# Patient Record
Sex: FEMALE | Race: WHITE | HISPANIC OR LATINO | Employment: UNEMPLOYED | URBAN - METROPOLITAN AREA
[De-identification: names, ages, dates, MRNs, and addresses within clinical notes are randomized per-mention and may not be internally consistent; named-entity substitution may affect disease eponyms.]

---

## 2018-02-08 ENCOUNTER — OFFICE VISIT (OUTPATIENT)
Dept: FAMILY MEDICINE | Facility: HOSPITAL | Age: 63
End: 2018-02-08
Payer: MEDICAID

## 2018-02-08 ENCOUNTER — HOSPITAL ENCOUNTER (OUTPATIENT)
Dept: RADIOLOGY | Facility: HOSPITAL | Age: 63
Discharge: HOME | End: 2018-02-08
Attending: FAMILY MEDICINE
Payer: MEDICAID

## 2018-02-08 VITALS
HEART RATE: 73 BPM | DIASTOLIC BLOOD PRESSURE: 85 MMHG | BODY MASS INDEX: 33.55 KG/M2 | SYSTOLIC BLOOD PRESSURE: 129 MMHG | WEIGHT: 189.38 LBS | HEIGHT: 63 IN

## 2018-02-08 DIAGNOSIS — Z12.31 ENCOUNTER FOR SCREENING MAMMOGRAM FOR BREAST CANCER: ICD-10-CM

## 2018-02-08 DIAGNOSIS — Z01.419 WELL WOMAN EXAM WITH ROUTINE GYNECOLOGICAL EXAM: Primary | ICD-10-CM

## 2018-02-08 DIAGNOSIS — Z01.419 WELL WOMAN EXAM WITH ROUTINE GYNECOLOGICAL EXAM: ICD-10-CM

## 2018-02-08 DIAGNOSIS — Z12.11 ENCOUNTER FOR SCREENING COLONOSCOPY: ICD-10-CM

## 2018-02-08 DIAGNOSIS — Z00.00 HEALTHCARE MAINTENANCE: ICD-10-CM

## 2018-02-08 PROCEDURE — 77063 BREAST TOMOSYNTHESIS BI: CPT | Mod: 26,,, | Performed by: RADIOLOGY

## 2018-02-08 PROCEDURE — 99213 OFFICE O/P EST LOW 20 MIN: CPT | Performed by: FAMILY MEDICINE

## 2018-02-08 PROCEDURE — 77067 SCR MAMMO BI INCL CAD: CPT | Mod: TC

## 2018-02-08 PROCEDURE — 77067 SCR MAMMO BI INCL CAD: CPT | Mod: 26,,, | Performed by: RADIOLOGY

## 2018-02-08 NOTE — PROGRESS NOTES
I was present in clinic during the visit and assume the primary medical care of this patient.  I discussed the case with Dr. Walsh, and I have reviewed and agree with the assessment and plan.

## 2018-02-08 NOTE — PROGRESS NOTES
Subjective:       Patient ID: Adenike Victoria is a 62 y.o. female.    Chief Complaint: Follow-up    HPI   62 year old female without any significant PMH that presents for check up. She states that she has been feeling well without any complaints. She is not up to date on her colonoscopy and needs a mammogram. She denies any vaginal complaints. She is  and her last menstrual cycle 10 years ago.     Review of Systems   Constitutional: Negative for fatigue and fever.   HENT: Negative for sore throat.    Eyes: Negative for visual disturbance.   Respiratory: Negative for shortness of breath and wheezing.    Cardiovascular: Negative for chest pain and palpitations.   Gastrointestinal: Negative for diarrhea and vomiting.   Genitourinary: Negative for menstrual problem and pelvic pain.   Skin: Negative for rash.   Neurological: Negative for headaches.   All other systems reviewed and are negative.      Objective:      Vitals:    02/08/18 0858   BP: 129/85   Pulse: 73     Physical Exam   Constitutional: She is oriented to person, place, and time. She appears well-developed and well-nourished.   HENT:   Head: Normocephalic and atraumatic.   Neck: Normal range of motion.   Cardiovascular: Normal rate and regular rhythm.    No murmur heard.  Pulmonary/Chest: Effort normal. No respiratory distress.   Abdominal: Soft. She exhibits no distension.   Genitourinary: Vagina normal and uterus normal. Cervix exhibits no motion tenderness. Right adnexum displays no mass. Left adnexum displays no mass. No erythema in the vagina. No vaginal discharge found.   Musculoskeletal: Normal range of motion.   Neurological: She is alert and oriented to person, place, and time.   Skin: Skin is warm and dry. No rash noted.   Psychiatric: She has a normal mood and affect.       Assessment:       1. Well woman exam with routine gynecological exam    2. Healthcare maintenance        Plan:       Well woman exam with routine gynecological exam  -      Lipid panel; Future; Expected date: 02/08/2018  -     Comprehensive metabolic panel; Future; Expected date: 02/08/2018  -     Mammo Digital Screening Bilateral With CAD; Future; Expected date: 02/08/2018    Healthcare maintenance  -     Ambulatory referral to Gastroenterology      Follow-up in about 3 months (around 5/8/2018).

## 2018-04-10 ENCOUNTER — OFFICE VISIT (OUTPATIENT)
Dept: GASTROENTEROLOGY | Facility: CLINIC | Age: 63
End: 2018-04-10
Payer: MEDICAID

## 2018-04-10 VITALS
SYSTOLIC BLOOD PRESSURE: 134 MMHG | HEART RATE: 86 BPM | HEIGHT: 63 IN | BODY MASS INDEX: 34.52 KG/M2 | DIASTOLIC BLOOD PRESSURE: 75 MMHG | WEIGHT: 194.81 LBS

## 2018-04-10 DIAGNOSIS — R12 HEARTBURN SYMPTOM: Primary | ICD-10-CM

## 2018-04-10 DIAGNOSIS — R19.7 DIARRHEA, UNSPECIFIED TYPE: ICD-10-CM

## 2018-04-10 DIAGNOSIS — R10.13 EPIGASTRIC PAIN: ICD-10-CM

## 2018-04-10 DIAGNOSIS — Z12.11 COLON CANCER SCREENING: ICD-10-CM

## 2018-04-10 DIAGNOSIS — R14.0 BLOATING: ICD-10-CM

## 2018-04-10 PROCEDURE — 99204 OFFICE O/P NEW MOD 45 MIN: CPT | Mod: S$GLB,,, | Performed by: INTERNAL MEDICINE

## 2018-04-10 NOTE — PATIENT INSTRUCTIONS
La colonoscopia     Se usa becky cámara acoplada a un tubo flexible con becky lente que yousuf imágenes de video.     La colonoscopia es un examen que permite mirar el interior del aparato digestivo inferior (el colon y el recto). Algunas veces de puede mirar la última parte del intestino murphy llamada íleon. Cody examen puede ayudar a detectar si hay cáncer de colon, así jerod a encontrar el origen de un dolor abdominal, sangrado y cambios en los hábitos intestinales.  Des la colonoscopia, un procedimiento ambulatorio que suele efectuarse en el hospital, el proveedor de atención médica puede extirpar (sacar) becky pequeña muestra de tejido, o biopsia, para enviarla a análisis; también puede extirpar pequeñas masas, jerod los pólipos. La frecuencia con la que se requiere becky colonoscopia depende de muchos factores incluyendo  la edad, las afecciones médicas, los antecedentes familiares, los síntomas y lo que se descubra des la colonoscopia anterior.  Los preparativos  · Asegúrese de mencionar al proveedor de atención médica todos los medicamentos que usted yousuf, así jerod cualquier problema de lashanda que tenga.   · Hable con chapin proveedor de atención médica acerca de los riesgos de cody examen, que incluyen sangrado y perforación del intestino, riesgos de la anestesia y los riesgos de que se pase por alto becky lesión.  · Ya que usted debe tener el recto y el colon vacíos para cody examen, asegúrese de seguir la dieta y las instrucciones de preparación de chapin intestino al pie de la letra. Si no lo hace, podría ser necesario postergarle el examen.  · Pregunte a chapin médico si necesita tener a un amigo o familiar preparado para que lo lleven a chapin casa después del examen.  Des el examen  · Le administrarán un sedante (medicamento relajante) a través de becky sonda IV. Quizás usted esté soñoliento o completamente dormido des el examen.  · El procedimiento lleva jerod mínimo 30 minutos.  · Des esta prueba podrían  hacerse biopsias (muestras de tejido), remoción de pólipos y otros tratamientos.  · El médico realiza un examen digital del recto para curt si hay algún problema allí o en el ano. Se lubrica el recto y se introduce el colonoscopio.   · Si usted está despierto, quizás sienta becky sensación similar a la que tiene cuando necesita evacuar. También podría sentir presión a medida que le bombean aire dentro del colon. No se preocupe si tiene que eliminar gases des el procedimiento.     Becky colonoscopia permite al médico curt todo el interior del colon.     Después del examen  · Es posible que chapin médico le informe de los resultados de inmediato, o en becky visita posterior.  · Trate de eliminar todos los gases daniel después del examen, para evitar becky hinchazón y cólicos.  · Después del examen podrá volver a chapin alimentación normal y reanudar mark actividades.  Los riesgos y posibles complicaciones incluyen:  · Sangrado (hemorragia)  · Punción o rasgadura en el colon  · Riesgos propios de la anestesia  · Pasar por alto becky lesión          Date Last Reviewed: 3/30/2014  © 0626-4168 [employer]. [address] [place] [address]. Todos los derechos reservados. Esta información no pretende sustituir la atención médica profesional. Sólo chapin médico puede diagnosticar y tratar un problema de lashanda.        GERD (Adult)    The esophagus is a tube that carries food from the mouth to the stomach. A valve at the lower end of the esophagus prevents stomach acid from flowing upward. When this valve doesn't work properly, stomach contents may repeatedly flow back up (reflux) into the esophagus. This is called gastroesophageal reflux disease (GERD). GERD can irritate the esophagus. It can cause problems with swallowing or breathing. In severe cases, GERD can cause recurrent pneumonia or other serious problems.  Symptoms of reflux include burning, pressure or sharp pain in the upper abdomen or mid to lower chest. The pain  "can spread to the neck, back, or shoulder. There may be belching, an acid taste in the back of the throat, chronic cough, or sore throat or hoarseness. GERD symptoms often occur during the day after a big meal. They can also occur at night when lying down.   Home care  Lifestyle changes can help reduce symptoms. If needed, medicines may be prescribed. Symptoms often improve with treatment, but if treatment is stopped, the symptoms often return after a few months. So most persons with GERD will need to continue treatment.  Lifestyle changes  · Limit or avoid fatty, fried, and spicy foods, as well as coffee, chocolate, mint, and foods with high acid content such as tomatoes and citrus fruit and juices (orange, grapefruit, lemon).  · Dont eat large meals, especially at night. Frequent, smaller meals are best. Do not lie down right after eating. And dont eat anything 3 hours before going to bed.  · Avoid drinking alcohol and smoking. As much as possible, stay away from second hand smoke.  · If you are overweight, losing weight will reduce symptoms.   · Avoid wearing tight clothing around your stomach area.  · If your symptoms occur during sleep, use a foam wedge to elevate your upper body (not just your head.) Or, place 4" blocks under the head of your bed.  Medicines  If needed, medicines can help relieve the symptoms of GERD and prevent damage to the esophagus. Discuss a medicine plan with your healthcare provider. This may include one or more of the following medicines:  · Antacids to help neutralize the normal acids in your stomach.  · Acid blockers (H2 blockers) to decrease acid production.  · Acid inhibitors (PPIs) to decrease acid production in a different way than the blockers. They may work better, but can take a little longer to take effect.  Take an antacid 30-60 minutes after eating and at bedtime, but not at the same time as an acid blocker.  Try not to take medicines such as ibuprofen and aspirin. If you " are taking aspirin for your heart or other medical reasons, talk to your healthcare provider about stopping it.  Follow-up care  Follow up with your healthcare provider or as advised by our staff.  When to seek medical advice  Call your healthcare provider if any of the following occur:  · Stomach pain gets worse or moves to the lower right abdomen (appendix area)  · Chest pain appears or gets worse, or spreads to the back, neck, shoulder, or arm  · Frequent vomiting (cant keep down liquids)  · Blood in the stool or vomit (red or black in color)  · Feeling weak or dizzy  · Fever of 100.4ºF (38ºC) or higher, or as directed by your healthcare provider  Date Last Reviewed: 6/23/2015  © 2880-0249 [employer]. [address] [place] [address], [place] [address]. All rights reserved. This information is not intended as a substitute for professional medical care. Always follow your healthcare professional's instructions.

## 2018-04-10 NOTE — PROGRESS NOTES
"Subjective:      Patient ID: Adenike Victoria is a 63 y.o. female.    Chief Complaint: Abdominal Pain and Gastroesophageal Reflux    HPI:   Patient 63-year-old female, native of [facility].  Gives a history of lifelong diarrhea.  Describes 2 or 3 watery bowel movements daily, typically postprandial  Also has postprandial bloating.  Has not traveled out of the country in the past year.  Denies any recent antibiotic use.  GI systems review includes frequent heartburn for which she takes Tums.  Some nausea.  Past medical history relatively unremarkable.  Nonsmoker.  Nondrinker.  Family history negative for GI disorders.    Review of patient's allergies indicates:  No Known Allergies  History reviewed. No pertinent past medical history.  History reviewed. No pertinent surgical history.  History reviewed. No pertinent family history.  Social History     Social History    Marital status:      Spouse name: N/A    Number of children: N/A    Years of education: N/A     Occupational History    Not on file.     Social History Main Topics    Smoking status: Never Smoker    Smokeless tobacco: Not on file    Alcohol use No    Drug use: No    Sexual activity: Not on file     Other Topics Concern    Not on file     Social History Narrative    No narrative on file       Review of Systems:  Constitutional: Negative for appetite change.  Weight gain  HENT: Negative for trouble swallowing.   Eyes: Negative for photophobia.   Respiratory: Negative for cough and shortness of breath.   Cardiovascular: Negative for palpitations.   Gastrointestinal: See HPI for details.  Genitourinary: Negative for frequency and hematuria.   Skin: Negative for rash.   Neurological: Negative for weakness and headaches.   Hematological: Negative.   Psychiatric/Behavioral: Negative for suicidal ideas and behavioral problems.     Objective:     /75 (BP Location: Right arm, Patient Position: Sitting)   Pulse 86   Ht 5' 3" (1.6 m)   Wt " 88.4 kg (194 lb 12.8 oz)   LMP 01/01/2013   BMI 34.51 kg/m²     Physical Exam:  Alert no distress.  Eyes: Pupils are equal, round, and reactive to light.   Neck: Supple. No mass  Cardiovascular: Regular rhythm . No murmur   Pulmonary/Chest: Lungs clear   Abdominal: Soft. No mass palpated. Nontender, no guarding. Positive bowel sounds   Musculoskeletal: No deformity. No edema.   Psychiatric: Alert and oriented    Assessment:     1. Heartburn symptom    2. Epigastric pain    3. Bloating    4. Diarrhea, unspecified type      Plan:     Adenike was seen today for abdominal pain and gastroesophageal reflux.    Diagnoses and all orders for this visit:    Heartburn symptom  -     ranitidine (ZANTAC) 300 MG tablet; Take 1 tablet (300 mg total) by mouth every evening.    Epigastric pain  -     US Abdomen Complete; Future    Bloating  -     TISSUE TRANSGLUTAMINASE (TTG), IGA; Future  -     IgA; Standing  -     Stool Exam-Ova,Cysts,Parasites; Future  -     Giardia / Cryptosporidum, EIA; Future  -     US Abdomen Complete; Future  -     IgA    Diarrhea, unspecified type  -     TISSUE TRANSGLUTAMINASE (TTG), IGA; Future  -     IgA; Standing  -     Stool Exam-Ova,Cysts,Parasites; Future  -     Giardia / Cryptosporidum, EIA; Future  -     IgA      Plan:  Antireflux measures, ranitidine daily  Stool studies, celiac screen  Colonoscopy, screening

## 2018-04-10 NOTE — LETTER
April 10, 2018      Babs Walsh MD  [address]  [facility] [address]           [facility]  [address] , [address]   [facility] [address]  Phone: [phone]  Fax: [phone]          Patient: Adenike Victoria   MR Number: [id number]   YOB: 1955   Date of Visit: 4/10/2018       Dear Dr. Babs Walsh:    Thank you for referring Adenike Victoria to me for evaluation. Attached you will find relevant portions of my assessment and plan of care.    If you have questions, please do not hesitate to call me. I look forward to following Adenike Victoria along with you.    Sincerely,    Jose Alberto Bell Jr., MD    Enclosure  CC:  No Recipients    If you would like to receive this communication electronically, please contact [email or web address] or [phone] to request more information on "[employer] Link access.    For providers and/or their staff who would like to refer a patient to [facility], please contact us through our one-stop-shop provider referral line, [facility], at [phone].    If you feel you have received this communication in error or would no longer like to receive these types of communications, please e-mail [email or web address]

## 2018-04-30 ENCOUNTER — TELEPHONE (OUTPATIENT)
Dept: GASTROENTEROLOGY | Facility: CLINIC | Age: 63
End: 2018-04-30

## 2018-04-30 DIAGNOSIS — Z12.11 ENCOUNTER FOR SCREENING COLONOSCOPY: Primary | ICD-10-CM

## 2018-04-30 NOTE — TELEPHONE ENCOUNTER
----- Message from Jose Alberto Bell Jr., MD sent at 4/13/2018  1:31 PM CDT -----  Lab work negative for intestinal parasites. Negative for celiac disease  Notify patient.

## 2018-04-30 NOTE — TELEPHONE ENCOUNTER
----- Message from Araseli Kilgore MA sent at 4/17/2018  7:36 AM CDT -----      ----- Message -----  From: Jose Alberto Bell Jr., MD  Sent: 4/16/2018   4:58 PM  To: [facility] Staff ([place])    Ultrasound shows one GB stone./  This could be a cause for abdominal pain  Plan f/u after all testing complete    Notify patient.

## 2018-04-30 NOTE — TELEPHONE ENCOUNTER
Patient was notified of test results and scheduled for an Colonoscopy at [facility] on 5/29/18.

## 2018-04-30 NOTE — TELEPHONE ENCOUNTER
Patient is scheduled for Screening Colonoscopy at [facility] on 5/29/18,prep instructions was explained and mailed out.

## 2018-05-22 ENCOUNTER — TELEPHONE (OUTPATIENT)
Dept: GASTROENTEROLOGY | Facility: CLINIC | Age: 63
End: 2018-05-22

## 2018-05-22 NOTE — TELEPHONE ENCOUNTER
Spoke with patient and let her know that we have to reschedule her colonoscopy with Dr. Trivedi and we will contact her when the doctors schedule is open.

## 2018-05-25 ENCOUNTER — TELEPHONE (OUTPATIENT)
Dept: GASTROENTEROLOGY | Facility: CLINIC | Age: 63
End: 2018-05-25

## 2018-05-25 NOTE — TELEPHONE ENCOUNTER
Spoke with patient and rescheduled her colonoscopy for 6/7/18 with Dr. Trivedi, spoke with Emily to have patient moved to schedule.

## 2018-06-07 PROBLEM — Z12.11 SCREEN FOR COLON CANCER: Status: ACTIVE | Noted: 2018-06-07

## 2018-06-19 ENCOUNTER — TELEPHONE (OUTPATIENT)
Dept: GASTROENTEROLOGY | Facility: CLINIC | Age: 63
End: 2018-06-19

## 2018-06-19 NOTE — TELEPHONE ENCOUNTER
----- Message from Mikayla Trivedi MD sent at 6/18/2018  8:49 AM CDT -----  Hyperplastic polyps.  She needs to RTC in 3 months so that repeat colonoscopy with aggressive 2 day prep can be ordered.

## 2018-10-23 ENCOUNTER — OFFICE VISIT (OUTPATIENT)
Dept: OPTOMETRY | Facility: CLINIC | Age: 63
End: 2018-10-23
Payer: MEDICAID

## 2018-10-23 DIAGNOSIS — H52.7 REFRACTIVE ERROR: ICD-10-CM

## 2018-10-23 DIAGNOSIS — H25.13 NUCLEAR SCLEROSIS OF BOTH EYES: Primary | ICD-10-CM

## 2018-10-23 PROCEDURE — 99212 OFFICE O/P EST SF 10 MIN: CPT | Mod: PBBFAC,PO | Performed by: OPTOMETRIST

## 2018-10-23 PROCEDURE — 92004 COMPRE OPH EXAM NEW PT 1/>: CPT | Mod: S$PBB,,, | Performed by: OPTOMETRIST

## 2018-10-23 PROCEDURE — 99999 PR PBB SHADOW E&M-EST. PATIENT-LVL II: CPT | Mod: PBBFAC,,, | Performed by: OPTOMETRIST

## 2018-10-23 PROCEDURE — 92015 DETERMINE REFRACTIVE STATE: CPT | Mod: ,,, | Performed by: OPTOMETRIST

## 2018-10-23 NOTE — PROGRESS NOTES
MOIZ BEGUM about 6 months ago elsewhere.  Has glasses for reading , about 2 yrs.   Old, don't seem to be right anymore. Didn't bring glasses today.    Last edited by Yulia Gonzalez on 10/23/2018  9:35 AM. (History)            Assessment /Plan     For exam results, see Encounter Report.    Nuclear sclerosis of both eyes    Refractive error      1. Educated pt on presence of cataracts and effects on vision. No surgery at this time. Recheck in one year.  2. Spec Rx given. Different lens options discussed with patient. RTC 1 year full exam.

## 2019-01-29 DIAGNOSIS — Z12.39 SCREENING BREAST EXAMINATION: Primary | ICD-10-CM

## 2019-02-25 ENCOUNTER — HOSPITAL ENCOUNTER (OUTPATIENT)
Dept: RADIOLOGY | Facility: HOSPITAL | Age: 64
Discharge: HOME | End: 2019-02-25
Payer: MEDICAID

## 2019-02-25 DIAGNOSIS — Z12.39 SCREENING BREAST EXAMINATION: ICD-10-CM

## 2019-02-25 PROCEDURE — 77067 MAMMO DIGITAL SCREENING BILAT WITH TOMOSYNTHESIS_CAD: ICD-10-PCS | Mod: 26,,, | Performed by: RADIOLOGY

## 2019-02-25 PROCEDURE — 77067 SCR MAMMO BI INCL CAD: CPT | Mod: TC

## 2019-02-25 PROCEDURE — 77063 BREAST TOMOSYNTHESIS BI: CPT | Mod: 26,,, | Performed by: RADIOLOGY

## 2019-02-25 PROCEDURE — 77067 SCR MAMMO BI INCL CAD: CPT | Mod: 26,,, | Performed by: RADIOLOGY

## 2019-02-25 PROCEDURE — 77063 MAMMO DIGITAL SCREENING BILAT WITH TOMOSYNTHESIS_CAD: ICD-10-PCS | Mod: 26,,, | Performed by: RADIOLOGY

## 2019-10-29 ENCOUNTER — TELEPHONE (OUTPATIENT)
Dept: TRANSPLANT | Facility: CLINIC | Age: 64
End: 2019-10-29

## 2019-10-29 DIAGNOSIS — Z00.5 TRANSPLANT DONOR EVALUATION: Primary | ICD-10-CM

## 2019-10-29 NOTE — TELEPHONE ENCOUNTER
Adenike Wagner called and stated that she is interested in becoming a living donor for her ex-, Shaan Victoria.  Medical and social history obtained.  No contraindications noted. Weight loss strongly encouraged. All questions answered.  Crossmatch scheduled 11/6/19. Patient verbalized understanding.

## 2019-11-06 ENCOUNTER — LAB VISIT (OUTPATIENT)
Dept: LAB | Facility: HOSPITAL | Age: 64
End: 2019-11-06
Payer: MEDICAID

## 2019-11-06 DIAGNOSIS — Z00.5 TRANSPLANT DONOR EVALUATION: ICD-10-CM

## 2019-11-06 LAB
ABO + RH BLD: NORMAL
BLD GP AB SCN CELLS X3 SERPL QL: NORMAL

## 2019-11-06 PROCEDURE — 81373 HLA I TYPING 1 LOCUS LR: CPT | Mod: 91,PO,TXP

## 2019-11-06 PROCEDURE — 81373 HLA I TYPING 1 LOCUS LR: CPT | Mod: PO,TXP

## 2019-11-06 PROCEDURE — 81376 HLA II TYPING 1 LOCUS LR: CPT | Mod: PO,TXP

## 2019-11-06 PROCEDURE — 81376 HLA II TYPING 1 LOCUS LR: CPT | Mod: 91,PO,TXP

## 2019-11-06 PROCEDURE — 36415 COLL VENOUS BLD VENIPUNCTURE: CPT | Mod: TXP

## 2019-11-06 PROCEDURE — 86901 BLOOD TYPING SEROLOGIC RH(D): CPT | Mod: TXP

## 2019-11-12 LAB — HLATY INTERPRETATION: NORMAL

## 2019-11-26 LAB
HLA DRB4 1: NORMAL
HLA SSO DNA TYPING CLASS I & II INTERPRETATION: NORMAL
HLA-A 1 SERO. EQUIV: 1
HLA-A 1: NORMAL
HLA-A 2 SERO. EQUIV: 68
HLA-A 2: NORMAL
HLA-B 1 SERO. EQUIV: 35
HLA-B 1: NORMAL
HLA-B 2 SERO. EQUIV: 51
HLA-B 2: NORMAL
HLA-BW 1 SERO. EQUIV: 6
HLA-BW 2 SERO. EQUIV: 4
HLA-C 1: NORMAL
HLA-C 2: NORMAL
HLA-CW 1 SERO. EQUIV: 2
HLA-CW 2 SERO. EQUIV: 4
HLA-DQ 1 SERO. EQUIV: 2
HLA-DQ 2 SERO. EQUIV: 6
HLA-DQB1 1: NORMAL
HLA-DQB1 2: NORMAL
HLA-DRB1 1 SERO. EQUIV: 17
HLA-DRB1 1: NORMAL
HLA-DRB1 2 SERO. EQUIV: 11
HLA-DRB1 2: NORMAL
HLA-DRB3 1: NORMAL
HLA-DRB3 2: NORMAL
HLA-DRB345 1 SERO. EQUIV: 52
HLA-DRB345 2 SERO. EQUIV: NORMAL
HLA-DRB4 2: NORMAL
HLA-DRB5 1: NORMAL
HLA-DRB5 2: NORMAL
SSDQB TESTING DATE: NORMAL
SSDRB TESTING DATE: NORMAL
SSOA TESTING DATE: NORMAL
SSOB TESTING DATE: NORMAL
SSOC TESTING DATE: NORMAL
SSODR TESTING DATE: NORMAL
TYSSO TESTING DATE: NORMAL

## 2019-12-18 ENCOUNTER — TELEPHONE (OUTPATIENT)
Dept: TRANSPLANT | Facility: CLINIC | Age: 64
End: 2019-12-18

## 2019-12-18 NOTE — TELEPHONE ENCOUNTER
Patient notified that the results of the crossmatch with her ex- show that they are compatible. Patient states he would like to proceed with the medical evaluation. Informed that we can not schedule testing until the recipient has been placed on the waiting list. Understands that I will notify her when testing can be scheduled. All questions were answered and patient verbalized understanding.     International  used for conversation: Gricel Tejada

## 2020-02-07 ENCOUNTER — DOCUMENTATION ONLY (OUTPATIENT)
Dept: TRANSPLANT | Facility: CLINIC | Age: 65
End: 2020-02-07

## 2020-09-03 ENCOUNTER — OFFICE VISIT (OUTPATIENT)
Dept: FAMILY MEDICINE | Facility: HOSPITAL | Age: 65
End: 2020-09-03
Attending: FAMILY MEDICINE
Payer: MEDICAID

## 2020-09-03 VITALS
DIASTOLIC BLOOD PRESSURE: 74 MMHG | SYSTOLIC BLOOD PRESSURE: 123 MMHG | HEART RATE: 79 BPM | WEIGHT: 195.31 LBS | BODY MASS INDEX: 33.34 KG/M2 | HEIGHT: 64 IN

## 2020-09-03 DIAGNOSIS — Z12.31 ENCOUNTER FOR SCREENING MAMMOGRAM FOR BREAST CANCER: ICD-10-CM

## 2020-09-03 DIAGNOSIS — G56.03 CARPAL TUNNEL SYNDROME, BILATERAL: ICD-10-CM

## 2020-09-03 DIAGNOSIS — Z00.00 HEALTHCARE MAINTENANCE: Primary | ICD-10-CM

## 2020-09-03 PROCEDURE — 99214 OFFICE O/P EST MOD 30 MIN: CPT | Performed by: STUDENT IN AN ORGANIZED HEALTH CARE EDUCATION/TRAINING PROGRAM

## 2020-09-03 RX ORDER — IBUPROFEN 800 MG/1
800 TABLET ORAL 3 TIMES DAILY
Qty: 42 TABLET | Refills: 0 | Status: SHIPPED | OUTPATIENT
Start: 2020-09-03 | End: 2020-09-17

## 2020-09-03 NOTE — PROGRESS NOTES
I assume primary medical responsibility for this patient. I have reviewed the history, physical, and assessement & treatment plan with the resident and agree that the care is reasonable and necessary. This service has been performed by a resident without the presence of a teaching physician under the primary care exception. If necessary, an addendum of additional findings or evaluation beyond the resident documentation will be noted below.    Likely carpal tunel, trial splints    Belinda Pace MD

## 2020-09-03 NOTE — PATIENT INSTRUCTIONS
Consejos para prevenir el síndrome del túnel carpiano  Ciertos movimientos repetitivos de la mano pueden incrementar el riesgo de desarrollar el síndrome del túnel carpiano (CTS, por mark siglas en inglés). Aprendiendo a modificar el modo cómo usa las yazan, puede ayudar a reducir el riesgo. Tenga presentes los consejos que siguen a continuación en la casa y en el trabajo, y asegúrese de seguir los reglamentos y procedimientos de seguridad de chapin compañía relativos a las yazan y muñecas.     Mantenga la chacorta en posición neutra (recta) al hacer ejercicio.       Mantenga la chacorta en posición neutra  Evite usar la chacorta en becky posición flexionada, extendida o torcida des mucho rato. Intente mantenerla en posición neutra (recta).  Tenga cuidado al agarrar cosas  Agarrar, tratar de alcanzar algo o levantarlo con los dedos pulgar e índice puede poner tensión en la chacorta. Siempre que pueda, use toda la mano y los dedos para agarrar un objeto.  Reduzca al mínimo la repetición  Tareas sencillas y ligeras pueden a la larga causar lesiones. Si es posible, evite los movimientos repetitivos o evite sujetar un objeto en la misma posición des mucho tiempo.  Descanse las yazan  Periódicamente, dé un respiro a mark yazan, dejándolas descansar un rato. O, podría alternar tareas fáciles y pesadas, cambiar de mano o variar las actividades laborales.  Reduzca la velocidad y la fuerza  Reducir la velocidad con que usted hace un movimiento forzado y repetitivo da tiempo a chapin chacorta para recuperarse. El uso de herramientas eléctricas ayuda a reducir la fuerza.  Ejercicios de preparación física  Ciertos ejercicios fortalecen los músculos de yazan y brazos. Pueden ser útiles al reducir la necesidad de compensar la debilidad de los músculos con becky postura inadecuada.  Date Last Reviewed: 9/11/2015  © 9542-7914 [employer]. [address] [place] [address], [place] [address]. Todos los derechos reservados. Esta información no  pretende sustituir la atención médica profesional. Sólo chapin médico puede diagnosticar y tratar un problema de lashanda.        Síndrome Del Túnel Carpiano [Carpal Tunnel Syndrome]    El síndrome del túnel carpiano es un trastorno doloroso que afecta a la chacorta y al brazo. Chapin causa es la presión sobre el nervio mediano.  El nervio mediano es pia de los nervios que dan sensibilidad y movimiento a la mano. Pasa a través de un túnel en la chacorta, llamado túnel carpiano. Tatyana túnel está formado por huesos y ligamentos. El estrechamiento de tatyana túnel o la inflamación de los tejidos en chapin interior ejerce presión sobre el nervio mediano, lo cual produce entumecimiento, pinchazos o dolor eléctrico en la mano y el antebrazo. A menudo el dolor aumenta por la noche y puede despertarlo.  El síndrome del túnel carpiano puede ocurrir des el embarazo y con el uso de píldoras anticonceptivas. Es más común en las personas cuyo trabajo requiere que doblen frecuentemente las muñecas o que utilicen máquinas o instrumentos que producen vibraciones denny.  Cuidados En [place]:  1. Descanse la chacorta dolorida. Evite doblar repetidamente la chacorta hacia adelante y hacia atrás, ya que esto ejerce presión sobre el nervio mediano. Evite el uso de máquinas o instrumentos que produzcan vibraciones denny.  2. Si le dieron becky férula, úsela por la noche mientras duerme. También puede usarla des el día para estar más cómodo.  3. Mueva los dedos y las muñecas a menudo para evitar la rigidez.  4. Algunas veces los cambios en el lugar de trabajo pueden aliviar los síntomas. Si usted pasa la mayor parte del día escribiendo en un teclado, puede ser útil que cambie la posición del teclado o que agregue un soporte para la chacorta. Chapin chacorta debe estar en becky posición neutral y no inclinada hacia atrás al escribir.  5. Puede jonnathan ibuprofeno (Motrin o Advil) o naproxeno (Aleve o Naprosyn) para aliviar el dolor y la inflamación, a menos que le  hayan recetado otro medicamento. Si no puede jonnathan estos medicamentos, el acetaminofén (Tylenol) puede ayudarle a aliviar el dolor, declan no reducirá la inflamación. [ NOTA: Si tiene becky enfermedad hepática o renal crónica, o ha tenido alguna vez becky úlcera estomacal o sangrado gastrointestinal, consulte con chapin médico antes de jonnathan estos medicamentos.]  6. Los medicamentos narcóticos contra el dolor sólo proporcionan alivio temporal y no tratan el problema. Si el dolor continúa, es posible que necesite becky inyección de esteroides en la chacorta.  7. Si las medidas anteriores no brad resultado, milagros vez necesite cirugía para abrir el túnel carpiano y aliviar la presión del nervio comprimido.  Programe becky VISITA DE CONTROL con chapin médico o con cody centro si el dolor no empieza a aliviarse en el transcurso de becky semana.  [NOTA: Si le hicieron radiografías, estas serán examinadas por un radiólogo y le informarán de los nuevos hallazgos que puedan afectar la atención médica que necesita.]  Busque Prontamente Atención Médica  si algo de lo siguiente ocurre:  · Dolor que no mejora con el tratamiento mencionado  · Mano o dedos fríos, azulados, entumecidos o con hormigueo  · Todo el brazo está hinchado o debilitado  Date Last Reviewed: 11/23/2015  © 9679-5814 [employer]. [address] [place] [address], [place] [address]. Todos los derechos reservados. Esta información no pretende sustituir la atención médica profesional. Sólo chapin médico puede diagnosticar y tratar un problema de lashanda.

## 2020-09-03 NOTE — PROGRESS NOTES
"History & Physical  Clinic      Patient ID:  NAME: Adenike Wagner  MR#: [id number]  : 1955    SUBJECTIVE:  CC: Orders (mammogram) and Fatigue    HPI: Ms. Adenike Wagner is a 65 y.o. female with no significant PMHx who presents today for a screening mammogram order and carpal tunnel symptoms. Pt states for about 1 year she's been having b/l hand numbness/tingling at night. Sometimes has numbness/tingling hands worse after texting on her phone during the day. Has not tried anything for it. She denies injury, neck pain, shoulder pain. Pt has been having some  strength weakness as well over the past couple of months.     Pt has not been to our clinic in 2 yrs. Denies alcohol/tobacco/drugs. Walks 6 miles/day. Does not eat a particular diet. Is not employed.    Post menopausal. LMP at age 57. Last pap "3 yrs ago and was normal"     Last colonoscopy  w/ polyps, was recommended to f/u in 3-6 months for repeat colonoscopy d/t fecal material blocking 25% of colon.    No family hx of medical issues  History reviewed. No pertinent past medical history.   Past Surgical History:   Procedure Laterality Date    COLONOSCOPY N/A 2018    Procedure: COLONOSCOPY;  Surgeon: Mikayla Trivedi MD;  Location: [facility];  Service: Endoscopy;  Laterality: N/A;  aborted, inadequate prep    COLONOSCOPY N/A 2018    Procedure: COLONOSCOPY;  Surgeon: Mikayla Trivedi MD;  Location: [facility];  Service: Endoscopy;  Laterality: N/A;      History reviewed. No pertinent family history.   Social History     Socioeconomic History    Marital status:      Spouse name: Not on file    Number of children: Not on file    Years of education: Not on file    Highest education level: Not on file   Occupational History    Not on file   Social Needs    Financial resource strain: Not on file    Food insecurity     Worry: Not on file     Inability: Not on file    Transportation needs     Medical: Not on file     " Non-medical: Not on file   Tobacco Use    Smoking status: Never Smoker    Smokeless tobacco: Never Used   Substance and Sexual Activity    Alcohol use: No    Drug use: No    Sexual activity: Not on file   Lifestyle    Physical activity     Days per week: Not on file     Minutes per session: Not on file    Stress: Not on file   Relationships    Social connections     Talks on phone: Not on file     Gets together: Not on file     Attends Bahai service: Not on file     Active member of club or organization: Not on file     Attends meetings of clubs or organizations: Not on file     Relationship status: Not on file   Other Topics Concern    Not on file   Social History Narrative    Not on file        There is no immunization history on file for this patient.   Review of patient's allergies indicates:  No Known Allergies   No current outpatient medications on file prior to visit.     No current facility-administered medications on file prior to visit.       Review of Systems   Constitutional: Negative for chills, diaphoresis, fever, malaise/fatigue and weight loss.   HENT: Negative for congestion and sore throat.    Eyes: Negative for blurred vision and double vision.   Respiratory: Negative for cough, shortness of breath and wheezing.    Cardiovascular: Negative for chest pain, palpitations and leg swelling.   Gastrointestinal: Negative for abdominal pain, constipation, diarrhea, melena, nausea and vomiting.   Genitourinary: Negative for dysuria and hematuria.   Musculoskeletal: Negative for back pain, joint pain and myalgias.   Skin: Negative for itching and rash.   Neurological: Positive for tingling, sensory change and weakness. Negative for dizziness and headaches.   Endo/Heme/Allergies: Negative for environmental allergies. Does not bruise/bleed easily.   Psychiatric/Behavioral: Negative for depression. The patient is not nervous/anxious.       OBJECTIVE:   /74 (BP Location: Right arm, Patient  "Position: Sitting, BP Method: Large (Automatic))   Pulse 79   Ht 5' 4" (1.626 m)   Wt 88.6 kg (195 lb 5.2 oz)   LMP 01/01/2013   BMI 33.53 kg/m²    BP Readings from Last 4 Encounters:   09/03/20 123/74   06/14/18 128/60   06/07/18 (!) 105/48   04/10/18 134/75      Wt Readings from Last 4 Encounters:   09/03/20 88.6 kg (195 lb 5.2 oz)   06/14/18 85.7 kg (189 lb)   06/07/18 88.9 kg (196 lb)   04/10/18 88.4 kg (194 lb 12.8 oz)      Physical Exam:  Physical Exam   Constitutional: She is oriented to person, place, and time and well-developed, well-nourished, and in no distress. No distress.   Obese   HENT:   Head: Normocephalic and atraumatic.   Mouth/Throat: Oropharynx is clear and moist.   Eyes: Pupils are equal, round, and reactive to light. Conjunctivae and EOM are normal.   Neck: Normal range of motion. Neck supple.   Cardiovascular: Normal rate, regular rhythm, normal heart sounds and intact distal pulses.   Pulmonary/Chest: Effort normal and breath sounds normal.   Abdominal: Soft. Bowel sounds are normal.   Musculoskeletal: Normal range of motion.      Comments: Full AROM/PROM at wrist. Neurovascularly intact.  strength 5/5 b/l.    +tinels, reverse phalen b/l   Neurological: She is alert and oriented to person, place, and time.   Skin: Skin is warm and dry. She is not diaphoretic.   Psychiatric: Affect normal.         Lab Results   Component Value Date    WBC 4.88 02/21/2014    HGB 12.5 02/21/2014    HCT 37.3 02/21/2014    MCV 87 02/21/2014     02/21/2014     Lab Results   Component Value Date     02/08/2018    K 3.8 02/08/2018     02/08/2018    CO2 30 (H) 02/08/2018    BUN 14 02/08/2018    CREATININE 0.7 02/08/2018    CALCIUM 10.3 02/08/2018     Lab Results   Component Value Date    AST 19 02/08/2018    ALT 18 02/08/2018    ALBUMIN 4.1 02/08/2018    PROT 8.4 02/08/2018    BILITOT 0.6 02/08/2018     Lab Results   Component Value Date    HGBA1C 5.4 02/21/2014    TSH 0.664 02/21/2014     "   Lab Results   Component Value Date    CHOL 262 (H) 02/08/2018    TRIG 230 (H) 02/08/2018    HDL 64 02/08/2018    LDLCALC 152.0 02/08/2018     The 10-year ASCVD risk score (Adriana MCGUIRE Jr., et al., 2013) is: 4.6%    Values used to calculate the score:      Age: 65 years      Sex: Female      Is Non- : No      Diabetic: No      Tobacco smoker: No      Systolic Blood Pressure: 123 mmHg      Is BP treated: No      HDL Cholesterol: 64 mg/dL      Total Cholesterol: 262 mg/dL   No results found for: HAV, HEPAIGM, HEPBIGM, HEPBCAB, HBEAG, HEPCAB   No results found for: HIV1X2     ASSESSMENT:  1. Healthcare maintenance    2. Carpal tunnel syndrome, bilateral    3. Encounter for screening mammogram for breast cancer        PLAN:  1. Healthcare maintenance  - Comprehensive metabolic panel; Future  - Hemoglobin A1C; Future  - Lipid Panel; Future  - CBC auto differential; Future  - TSH; Future  - HIV 1/2 Ag/Ab (4th Gen); Future  - Hepatitis C Antibody; Future    2. Carpal tunnel syndrome, bilateral  - ibuprofen (ADVIL,MOTRIN) 800 MG tablet; Take 1 tablet (800 mg total) by mouth 3 (three) times daily. for 14 days  Dispense: 42 tablet; Refill: 0    3. Encounter for screening mammogram for breast cancer  - Mammo Digital Screening Bilateral With CAD; Future        There is no immunization history on file for this patient.     Health Maintenance:  - Colonoscopy: DUE - polyps in 2018, but needs repeat d/t incomplete visualization of colon   (Grade A: adults 50-75; colonoscopy q10y or annual fecal immunochemical testing (FIT) as first-tier test; CT colonography q5y, FIT-fecal DNA testing q3y, or flexible sigmoidoscopy q5-10 years as second-tier tests; and capsule colonography q5y as a third-tier test)  - DM: Not Diabetic  (Grade B: adults 40-70 with BMI ?25; if normal, repeat q3y)  - If Diabetic:   - Foot Exam: N/A    - Eye Exam: N/A  - MMG: DUE - ORDERED  (Grade B: q1-2y for women 40-75; >75 after discussion on  harms and benefits with patient)  - PAP: DUE - needs at next visit  (Grade A: q3y with cervical cytology alone in women 21-29 years. For women 30-65 years, q3y with cervical cytology alone, q5y with high-risk HPV (hrHPV) testing alone, or q5y with hrHPV testing in combination with cytology)  - Statin: N/A  (Grade B: adults 40-75 years with no history of CVD, ?1 CVD risk factors (dyslipidemia, DM, HTN, or smoking), and ASCVD ?10%)  - Flu: Instructed patient to receive flu vaccine - Patient agreed  (All patients ?6 months, qyear)  - PCV 13: N/A  (adults ?65 years; adults <64 years with HIV, asplenia, CKD, malignancy or solid organ transplant)  - PPSV 23: N/A  (adults ?65 years; adults <64 years who smoke, long-term facility care resident, heart disease (ex. HTN), lung disease, liver disease, DM or alcohol)   - If PPSV 23 is given, wait 1 year before giving PCV 13  - Shingles: Instructed patient to receive shingles vaccine - Patient agreed  (adults ?50 years)  - HCV: DUE - ORDERED  (Grade B: screen all patients age 18-79)  - HIV: DUE - ORDERED  (Grade A: ages 15-65 years; ?66 if high-risk)  - DXA: N/A  (Grade B: women ?65 years or post-menopausal women with risk-factors)  - LDCT: N/A  (Grade B: q1yr for adults 55-80 years with 30 PY and currently smoke or have quit ?15 years)  - US abdomen: N/A   (Grade B: one-time screening for AAA in men 65-75 years who have ever smoked)  - ASA: N/A  (Grade B: low dose ASA for adults 50-59 years with a ?10% 10-year cardiovascular risk)  - Depression: NO  (All adults)  - Fall risk: NO  Get Up and Go Test (positive >30 seconds, or negative <20; get up, walk 10 feet, turn around and sit; adults ?65 years).  - Tobacco abuse: NEVER SMOKER  (Grade A: all adults)    DUE AT NEXT/FUTURE VISIT:  Colon cancer screening and Pap Smear        Future Appointments   Date Time Provider Department Center   10/16/2020  8:00 AM Denver Abbott OD [facility] [place]       Dispo: RTC 3 months  with labs. Instructed pt to wear cockup splints at night for carpal tunnel syndrome. Also gave prescription for ibuprofen 14 day course. Ordered screening mammogram and labs today.     Aj Salgado MD, PGY-II  09/03/2020

## 2020-09-11 ENCOUNTER — TELEPHONE (OUTPATIENT)
Dept: FAMILY MEDICINE | Facility: HOSPITAL | Age: 65
End: 2020-09-11

## 2020-09-24 ENCOUNTER — HOSPITAL ENCOUNTER (OUTPATIENT)
Dept: RADIOLOGY | Facility: HOSPITAL | Age: 65
Discharge: HOME | End: 2020-09-24
Attending: STUDENT IN AN ORGANIZED HEALTH CARE EDUCATION/TRAINING PROGRAM
Payer: MEDICAID

## 2020-09-24 DIAGNOSIS — Z12.31 ENCOUNTER FOR SCREENING MAMMOGRAM FOR BREAST CANCER: ICD-10-CM

## 2020-09-24 PROCEDURE — 77067 SCR MAMMO BI INCL CAD: CPT | Mod: 26,,, | Performed by: RADIOLOGY

## 2020-09-24 PROCEDURE — 77063 BREAST TOMOSYNTHESIS BI: CPT | Mod: 26,,, | Performed by: RADIOLOGY

## 2020-09-24 PROCEDURE — 77067 SCR MAMMO BI INCL CAD: CPT | Mod: TC

## 2020-09-24 PROCEDURE — 77063 MAMMO DIGITAL SCREENING BILAT WITH TOMO: ICD-10-PCS | Mod: 26,,, | Performed by: RADIOLOGY

## 2020-09-24 PROCEDURE — 77067 MAMMO DIGITAL SCREENING BILAT WITH TOMO: ICD-10-PCS | Mod: 26,,, | Performed by: RADIOLOGY

## 2020-11-16 ENCOUNTER — OFFICE VISIT (OUTPATIENT)
Dept: OPTOMETRY | Facility: CLINIC | Age: 65
End: 2020-11-16
Payer: MEDICAID

## 2020-11-16 DIAGNOSIS — H25.13 NUCLEAR SCLEROSIS OF BOTH EYES: Primary | ICD-10-CM

## 2020-11-16 DIAGNOSIS — H02.9 LESION OF EYELID: ICD-10-CM

## 2020-11-16 DIAGNOSIS — H52.7 REFRACTIVE ERROR: ICD-10-CM

## 2020-11-16 PROCEDURE — 99212 OFFICE O/P EST SF 10 MIN: CPT | Mod: PBBFAC,PO | Performed by: OPTOMETRIST

## 2020-11-16 PROCEDURE — 99999 PR PBB SHADOW E&M-EST. PATIENT-LVL II: CPT | Mod: PBBFAC,,, | Performed by: OPTOMETRIST

## 2020-11-16 PROCEDURE — 92014 PR EYE EXAM, EST PATIENT,COMPREHESV: ICD-10-PCS | Mod: S$PBB,,, | Performed by: OPTOMETRIST

## 2020-11-16 PROCEDURE — 99999 PR PBB SHADOW E&M-EST. PATIENT-LVL II: ICD-10-PCS | Mod: PBBFAC,,, | Performed by: OPTOMETRIST

## 2020-11-16 PROCEDURE — 92014 COMPRE OPH EXAM EST PT 1/>: CPT | Mod: S$PBB,,, | Performed by: OPTOMETRIST

## 2020-11-16 PROCEDURE — 92015 PR REFRACTION: ICD-10-PCS | Mod: ,,, | Performed by: OPTOMETRIST

## 2020-11-16 PROCEDURE — 92015 DETERMINE REFRACTIVE STATE: CPT | Mod: ,,, | Performed by: OPTOMETRIST

## 2020-11-16 NOTE — PROGRESS NOTES
MOIZ BEGUM 10/2018  Wears OTC +2.25 readers, seems to work fine.  Distance seems   fine without glasses.  Patient has noticed a bump under RLL and it has   progressively gotten bigger.  Not using any drops.     Last edited by Yulia Gonzalez on 11/16/2020  8:51 AM. (History)            Assessment /Plan     For exam results, see Encounter Report.    Nuclear sclerosis of both eyes    Refractive error    Lesion of eyelid      1. Educated pt on presence of cataracts and effects on vision. No surgery at this time. Recheck in one year.  2. New Spec Rx given. Different lens options discussed with patient. RTC 1 year full exam.  3. Refer to [facility] for eval.

## 2020-11-18 ENCOUNTER — TELEPHONE (OUTPATIENT)
Dept: OPHTHALMOLOGY | Facility: CLINIC | Age: 65
End: 2020-11-18

## 2021-03-04 ENCOUNTER — OFFICE VISIT (OUTPATIENT)
Dept: FAMILY MEDICINE | Facility: HOSPITAL | Age: 66
End: 2021-03-04
Attending: FAMILY MEDICINE
Payer: MEDICAID

## 2021-03-04 VITALS
DIASTOLIC BLOOD PRESSURE: 79 MMHG | HEIGHT: 64 IN | HEART RATE: 72 BPM | WEIGHT: 192 LBS | SYSTOLIC BLOOD PRESSURE: 147 MMHG | BODY MASS INDEX: 32.78 KG/M2

## 2021-03-04 DIAGNOSIS — M25.561 PAIN IN BOTH KNEES, UNSPECIFIED CHRONICITY: Primary | ICD-10-CM

## 2021-03-04 DIAGNOSIS — M25.562 PAIN IN BOTH KNEES, UNSPECIFIED CHRONICITY: Primary | ICD-10-CM

## 2021-03-04 PROCEDURE — 99213 OFFICE O/P EST LOW 20 MIN: CPT | Performed by: STUDENT IN AN ORGANIZED HEALTH CARE EDUCATION/TRAINING PROGRAM

## 2021-03-04 RX ORDER — NAPROXEN 500 MG/1
500 TABLET ORAL DAILY PRN
Qty: 30 TABLET | Refills: 2 | Status: SHIPPED | OUTPATIENT
Start: 2021-03-04 | End: 2021-04-07 | Stop reason: SDUPTHER

## 2021-03-25 ENCOUNTER — OFFICE VISIT (OUTPATIENT)
Dept: OPHTHALMOLOGY | Facility: CLINIC | Age: 66
End: 2021-03-25
Payer: MEDICAID

## 2021-03-25 ENCOUNTER — TELEPHONE (OUTPATIENT)
Dept: OPHTHALMOLOGY | Facility: CLINIC | Age: 66
End: 2021-03-25

## 2021-03-25 DIAGNOSIS — H02.9 EYELID LESION: Primary | ICD-10-CM

## 2021-03-25 DIAGNOSIS — Z13.9 SCREENING FOR UNSPECIFIED CONDITION: Primary | ICD-10-CM

## 2021-03-25 PROCEDURE — 99999 PR PBB SHADOW E&M-EST. PATIENT-LVL II: CPT | Mod: PBBFAC,,, | Performed by: OPHTHALMOLOGY

## 2021-03-25 PROCEDURE — 92285 EXTERNAL PHOTOGRAPHY - OU - BOTH EYES: ICD-10-PCS | Mod: 26,S$PBB,, | Performed by: OPHTHALMOLOGY

## 2021-03-25 PROCEDURE — 99204 PR OFFICE/OUTPT VISIT, NEW, LEVL IV, 45-59 MIN: ICD-10-PCS | Mod: S$PBB,,, | Performed by: OPHTHALMOLOGY

## 2021-03-25 PROCEDURE — 92285 EXTERNAL OCULAR PHOTOGRAPHY: CPT | Mod: PBBFAC | Performed by: OPHTHALMOLOGY

## 2021-03-25 PROCEDURE — 99212 OFFICE O/P EST SF 10 MIN: CPT | Mod: PBBFAC,25 | Performed by: OPHTHALMOLOGY

## 2021-03-25 PROCEDURE — 99204 OFFICE O/P NEW MOD 45 MIN: CPT | Mod: S$PBB,,, | Performed by: OPHTHALMOLOGY

## 2021-03-25 PROCEDURE — 99999 PR PBB SHADOW E&M-EST. PATIENT-LVL II: ICD-10-PCS | Mod: PBBFAC,,, | Performed by: OPHTHALMOLOGY

## 2021-04-07 ENCOUNTER — HOSPITAL ENCOUNTER (OUTPATIENT)
Dept: RADIOLOGY | Facility: HOSPITAL | Age: 66
Discharge: HOME | End: 2021-04-07
Attending: STUDENT IN AN ORGANIZED HEALTH CARE EDUCATION/TRAINING PROGRAM
Payer: MEDICAID

## 2021-04-07 ENCOUNTER — OFFICE VISIT (OUTPATIENT)
Dept: FAMILY MEDICINE | Facility: HOSPITAL | Age: 66
End: 2021-04-07
Attending: FAMILY MEDICINE
Payer: MEDICAID

## 2021-04-07 VITALS
HEIGHT: 64 IN | HEART RATE: 90 BPM | WEIGHT: 191.81 LBS | SYSTOLIC BLOOD PRESSURE: 133 MMHG | BODY MASS INDEX: 32.74 KG/M2 | DIASTOLIC BLOOD PRESSURE: 85 MMHG

## 2021-04-07 DIAGNOSIS — M25.562 LEFT KNEE PAIN, UNSPECIFIED CHRONICITY: ICD-10-CM

## 2021-04-07 DIAGNOSIS — M25.562 LEFT KNEE PAIN, UNSPECIFIED CHRONICITY: Primary | ICD-10-CM

## 2021-04-07 PROCEDURE — 73562 X-RAY EXAM OF KNEE 3: CPT | Mod: 26,LT,, | Performed by: RADIOLOGY

## 2021-04-07 PROCEDURE — 73562 XR KNEE 3 VIEW LEFT: ICD-10-PCS | Mod: 26,LT,, | Performed by: RADIOLOGY

## 2021-04-07 PROCEDURE — 73562 X-RAY EXAM OF KNEE 3: CPT | Mod: TC,FY,LT

## 2021-04-07 PROCEDURE — 99213 OFFICE O/P EST LOW 20 MIN: CPT | Mod: 25 | Performed by: STUDENT IN AN ORGANIZED HEALTH CARE EDUCATION/TRAINING PROGRAM

## 2021-04-07 RX ORDER — NAPROXEN 500 MG/1
500 TABLET ORAL 2 TIMES DAILY WITH MEALS
Qty: 30 TABLET | Refills: 2 | Status: SHIPPED | OUTPATIENT
Start: 2021-04-07 | End: 2021-12-06 | Stop reason: CLARIF

## 2021-05-13 ENCOUNTER — CLINICAL SUPPORT (OUTPATIENT)
Dept: REHABILITATION | Facility: HOSPITAL | Age: 66
End: 2021-05-13
Payer: MEDICAID

## 2021-05-13 DIAGNOSIS — M25.662 DECREASED RANGE OF MOTION (ROM) OF LEFT KNEE: ICD-10-CM

## 2021-05-13 DIAGNOSIS — R26.89 IMPAIRED GAIT AND MOBILITY: ICD-10-CM

## 2021-05-13 DIAGNOSIS — M25.562 CHRONIC PAIN OF LEFT KNEE: Primary | ICD-10-CM

## 2021-05-13 DIAGNOSIS — G89.29 CHRONIC PAIN OF LEFT KNEE: Primary | ICD-10-CM

## 2021-05-13 PROCEDURE — 97162 PT EVAL MOD COMPLEX 30 MIN: CPT | Mod: PN

## 2021-05-17 PROBLEM — G89.29 CHRONIC PAIN OF LEFT KNEE: Status: ACTIVE | Noted: 2021-05-17

## 2021-05-17 PROBLEM — M25.562 CHRONIC PAIN OF LEFT KNEE: Status: ACTIVE | Noted: 2021-05-17

## 2021-05-17 PROBLEM — M25.662 DECREASED RANGE OF MOTION (ROM) OF LEFT KNEE: Status: ACTIVE | Noted: 2021-05-17

## 2021-05-17 PROBLEM — R26.89 IMPAIRED GAIT AND MOBILITY: Status: ACTIVE | Noted: 2021-05-17

## 2021-05-28 ENCOUNTER — TELEPHONE (OUTPATIENT)
Dept: REHABILITATION | Facility: HOSPITAL | Age: 66
End: 2021-05-28

## 2021-05-29 ENCOUNTER — LAB VISIT (OUTPATIENT)
Dept: INTERNAL MEDICINE | Facility: CLINIC | Age: 66
End: 2021-05-29
Payer: MEDICAID

## 2021-05-29 DIAGNOSIS — Z13.9 SCREENING FOR UNSPECIFIED CONDITION: ICD-10-CM

## 2021-05-29 PROCEDURE — U0005 INFEC AGEN DETEC AMPLI PROBE: HCPCS | Performed by: OPHTHALMOLOGY

## 2021-05-29 PROCEDURE — U0003 INFECTIOUS AGENT DETECTION BY NUCLEIC ACID (DNA OR RNA); SEVERE ACUTE RESPIRATORY SYNDROME CORONAVIRUS 2 (SARS-COV-2) (CORONAVIRUS DISEASE [COVID-19]), AMPLIFIED PROBE TECHNIQUE, MAKING USE OF HIGH THROUGHPUT TECHNOLOGIES AS DESCRIBED BY CMS-2020-01-R: HCPCS | Performed by: OPHTHALMOLOGY

## 2021-05-30 LAB — SARS-COV-2 RNA RESP QL NAA+PROBE: NOT DETECTED

## 2021-05-31 ENCOUNTER — PROCEDURE VISIT (OUTPATIENT)
Dept: OPHTHALMOLOGY | Facility: CLINIC | Age: 66
End: 2021-05-31
Payer: MEDICAID

## 2021-05-31 VITALS — DIASTOLIC BLOOD PRESSURE: 79 MMHG | SYSTOLIC BLOOD PRESSURE: 133 MMHG | HEART RATE: 74 BPM

## 2021-05-31 DIAGNOSIS — H02.9 EYELID LESION: Primary | ICD-10-CM

## 2021-05-31 PROCEDURE — 88342 IMHCHEM/IMCYTCHM 1ST ANTB: CPT | Mod: 26,,, | Performed by: PATHOLOGY

## 2021-05-31 PROCEDURE — 11440 EXC FACE-MM B9+MARG 0.5 CM/<: CPT | Mod: PBBFAC | Performed by: OPHTHALMOLOGY

## 2021-05-31 PROCEDURE — 99499 UNLISTED E&M SERVICE: CPT | Mod: S$PBB,,, | Performed by: OPHTHALMOLOGY

## 2021-05-31 PROCEDURE — 88342 CHG IMMUNOCYTOCHEMISTRY: ICD-10-PCS | Mod: 26,,, | Performed by: PATHOLOGY

## 2021-05-31 PROCEDURE — 88342 IMHCHEM/IMCYTCHM 1ST ANTB: CPT | Performed by: PATHOLOGY

## 2021-05-31 PROCEDURE — 11440 EXC FACE-MM B9+MARG 0.5 CM/<: CPT | Mod: S$PBB,,, | Performed by: OPHTHALMOLOGY

## 2021-05-31 PROCEDURE — 88305 TISSUE EXAM BY PATHOLOGIST: CPT | Performed by: PATHOLOGY

## 2021-05-31 PROCEDURE — 88341 PR IHC OR ICC EACH ADD'L SINGLE ANTIBODY  STAINPR: ICD-10-PCS | Mod: 26,,, | Performed by: PATHOLOGY

## 2021-05-31 PROCEDURE — 11440 PR EXC SKIN BENIG <0.5 CM FACE,FACIAL: ICD-10-PCS | Mod: S$PBB,,, | Performed by: OPHTHALMOLOGY

## 2021-05-31 PROCEDURE — 88305 TISSUE EXAM BY PATHOLOGIST: ICD-10-PCS | Mod: 26,,, | Performed by: PATHOLOGY

## 2021-05-31 PROCEDURE — 88341 IMHCHEM/IMCYTCHM EA ADD ANTB: CPT | Mod: 26,,, | Performed by: PATHOLOGY

## 2021-05-31 PROCEDURE — 88305 TISSUE EXAM BY PATHOLOGIST: CPT | Mod: 26,,, | Performed by: PATHOLOGY

## 2021-05-31 PROCEDURE — 99499 NO LOS: ICD-10-PCS | Mod: S$PBB,,, | Performed by: OPHTHALMOLOGY

## 2021-05-31 PROCEDURE — 88341 IMHCHEM/IMCYTCHM EA ADD ANTB: CPT | Performed by: PATHOLOGY

## 2021-06-14 ENCOUNTER — TELEPHONE (OUTPATIENT)
Dept: OPHTHALMOLOGY | Facility: CLINIC | Age: 66
End: 2021-06-14

## 2021-06-14 LAB
FINAL PATHOLOGIC DIAGNOSIS: NORMAL
GROSS: NORMAL
Lab: NORMAL
MICROSCOPIC EXAM: NORMAL

## 2021-07-06 ENCOUNTER — TELEPHONE (OUTPATIENT)
Dept: REHABILITATION | Facility: HOSPITAL | Age: 66
End: 2021-07-06

## 2021-07-14 ENCOUNTER — DOCUMENTATION ONLY (OUTPATIENT)
Dept: REHABILITATION | Facility: HOSPITAL | Age: 66
End: 2021-07-14

## 2021-08-17 ENCOUNTER — DOCUMENTATION ONLY (OUTPATIENT)
Dept: REHABILITATION | Facility: HOSPITAL | Age: 66
End: 2021-08-17

## 2021-08-17 PROBLEM — G89.29 CHRONIC PAIN OF LEFT KNEE: Status: RESOLVED | Noted: 2021-05-17 | Resolved: 2021-08-17

## 2021-08-17 PROBLEM — M25.662 DECREASED RANGE OF MOTION (ROM) OF LEFT KNEE: Status: RESOLVED | Noted: 2021-05-17 | Resolved: 2021-08-17

## 2021-08-17 PROBLEM — R26.89 IMPAIRED GAIT AND MOBILITY: Status: RESOLVED | Noted: 2021-05-17 | Resolved: 2021-08-17

## 2021-08-17 PROBLEM — M25.562 CHRONIC PAIN OF LEFT KNEE: Status: RESOLVED | Noted: 2021-05-17 | Resolved: 2021-08-17

## 2021-10-04 ENCOUNTER — TELEPHONE (OUTPATIENT)
Dept: FAMILY MEDICINE | Facility: HOSPITAL | Age: 66
End: 2021-10-04

## 2021-10-05 DIAGNOSIS — Z12.31 ENCOUNTER FOR SCREENING MAMMOGRAM FOR BREAST CANCER: Primary | ICD-10-CM

## 2021-10-14 ENCOUNTER — HOSPITAL ENCOUNTER (OUTPATIENT)
Dept: RADIOLOGY | Facility: HOSPITAL | Age: 66
Discharge: HOME | End: 2021-10-14
Attending: STUDENT IN AN ORGANIZED HEALTH CARE EDUCATION/TRAINING PROGRAM
Payer: MEDICAID

## 2021-10-14 DIAGNOSIS — Z12.31 ENCOUNTER FOR SCREENING MAMMOGRAM FOR BREAST CANCER: ICD-10-CM

## 2021-10-14 PROCEDURE — 77063 BREAST TOMOSYNTHESIS BI: CPT | Mod: 26,,, | Performed by: RADIOLOGY

## 2021-10-14 PROCEDURE — 77067 MAMMO DIGITAL SCREENING BILAT WITH TOMO: ICD-10-PCS | Mod: 26,,, | Performed by: RADIOLOGY

## 2021-10-14 PROCEDURE — 77063 MAMMO DIGITAL SCREENING BILAT WITH TOMO: ICD-10-PCS | Mod: 26,,, | Performed by: RADIOLOGY

## 2021-10-14 PROCEDURE — 77067 SCR MAMMO BI INCL CAD: CPT | Mod: TC

## 2021-10-14 PROCEDURE — 77067 SCR MAMMO BI INCL CAD: CPT | Mod: 26,,, | Performed by: RADIOLOGY

## 2021-12-06 ENCOUNTER — HOSPITAL ENCOUNTER (EMERGENCY)
Facility: HOSPITAL | Age: 66
Discharge: HOME | End: 2021-12-06
Attending: EMERGENCY MEDICINE
Payer: MEDICAID

## 2021-12-06 VITALS
DIASTOLIC BLOOD PRESSURE: 72 MMHG | HEIGHT: 63 IN | SYSTOLIC BLOOD PRESSURE: 132 MMHG | HEART RATE: 78 BPM | RESPIRATION RATE: 18 BRPM | BODY MASS INDEX: 35.44 KG/M2 | WEIGHT: 200 LBS | OXYGEN SATURATION: 99 % | TEMPERATURE: 99 F

## 2021-12-06 DIAGNOSIS — K52.9 GASTROENTERITIS: Primary | ICD-10-CM

## 2021-12-06 DIAGNOSIS — R10.9 ABDOMINAL PAIN: ICD-10-CM

## 2021-12-06 DIAGNOSIS — R06.02 SOB (SHORTNESS OF BREATH): ICD-10-CM

## 2021-12-06 LAB
ALBUMIN SERPL BCP-MCNC: 3.7 G/DL (ref 3.5–5.2)
ALP SERPL-CCNC: 110 U/L (ref 55–135)
ALT SERPL W/O P-5'-P-CCNC: 8 U/L (ref 10–44)
ANION GAP SERPL CALC-SCNC: 9 MMOL/L (ref 8–16)
AST SERPL-CCNC: 12 U/L (ref 10–40)
BACTERIA #/AREA URNS HPF: NORMAL /HPF
BASOPHILS # BLD AUTO: 0.04 K/UL (ref 0–0.2)
BASOPHILS NFR BLD: 0.5 % (ref 0–1.9)
BILIRUB SERPL-MCNC: 0.5 MG/DL (ref 0.1–1)
BILIRUB UR QL STRIP: NEGATIVE
BUN SERPL-MCNC: 13 MG/DL (ref 8–23)
CALCIUM SERPL-MCNC: 9.1 MG/DL (ref 8.7–10.5)
CHLORIDE SERPL-SCNC: 106 MMOL/L (ref 95–110)
CLARITY UR: CLEAR
CO2 SERPL-SCNC: 24 MMOL/L (ref 23–29)
COLOR UR: COLORLESS
CREAT SERPL-MCNC: 0.8 MG/DL (ref 0.5–1.4)
DIFFERENTIAL METHOD: ABNORMAL
EOSINOPHIL # BLD AUTO: 0.1 K/UL (ref 0–0.5)
EOSINOPHIL NFR BLD: 1.4 % (ref 0–8)
ERYTHROCYTE [DISTWIDTH] IN BLOOD BY AUTOMATED COUNT: 11.9 % (ref 11.5–14.5)
EST. GFR  (AFRICAN AMERICAN): >60 ML/MIN/1.73 M^2
EST. GFR  (NON AFRICAN AMERICAN): >60 ML/MIN/1.73 M^2
GLUCOSE SERPL-MCNC: 86 MG/DL (ref 70–110)
GLUCOSE UR QL STRIP: NEGATIVE
HCT VFR BLD AUTO: 33.3 % (ref 37–48.5)
HGB BLD-MCNC: 11.1 G/DL (ref 12–16)
HGB UR QL STRIP: ABNORMAL
IMM GRANULOCYTES # BLD AUTO: 0.02 K/UL (ref 0–0.04)
IMM GRANULOCYTES NFR BLD AUTO: 0.2 % (ref 0–0.5)
KETONES UR QL STRIP: NEGATIVE
LEUKOCYTE ESTERASE UR QL STRIP: ABNORMAL
LIPASE SERPL-CCNC: 13 U/L (ref 4–60)
LYMPHOCYTES # BLD AUTO: 1.3 K/UL (ref 1–4.8)
LYMPHOCYTES NFR BLD: 14.7 % (ref 18–48)
MCH RBC QN AUTO: 28.9 PG (ref 27–31)
MCHC RBC AUTO-ENTMCNC: 33.3 G/DL (ref 32–36)
MCV RBC AUTO: 87 FL (ref 82–98)
MICROSCOPIC COMMENT: NORMAL
MONOCYTES # BLD AUTO: 0.7 K/UL (ref 0.3–1)
MONOCYTES NFR BLD: 7.9 % (ref 4–15)
NEUTROPHILS # BLD AUTO: 6.4 K/UL (ref 1.8–7.7)
NEUTROPHILS NFR BLD: 75.3 % (ref 38–73)
NITRITE UR QL STRIP: NEGATIVE
NRBC BLD-RTO: 0 /100 WBC
PH UR STRIP: 6 [PH] (ref 5–8)
PLATELET # BLD AUTO: 293 K/UL (ref 150–450)
PMV BLD AUTO: 9.3 FL (ref 9.2–12.9)
POTASSIUM SERPL-SCNC: 4.2 MMOL/L (ref 3.5–5.1)
PROT SERPL-MCNC: 7.2 G/DL (ref 6–8.4)
PROT UR QL STRIP: NEGATIVE
RBC # BLD AUTO: 3.84 M/UL (ref 4–5.4)
RBC #/AREA URNS HPF: 4 /HPF (ref 0–4)
SODIUM SERPL-SCNC: 139 MMOL/L (ref 136–145)
SP GR UR STRIP: 1.01 (ref 1–1.03)
SQUAMOUS #/AREA URNS HPF: 0 /HPF
TROPONIN I SERPL DL<=0.01 NG/ML-MCNC: <0.006 NG/ML (ref 0–0.03)
URN SPEC COLLECT METH UR: ABNORMAL
UROBILINOGEN UR STRIP-ACNC: NEGATIVE EU/DL
WBC # BLD AUTO: 8.48 K/UL (ref 3.9–12.7)
WBC #/AREA URNS HPF: 3 /HPF (ref 0–5)

## 2021-12-06 PROCEDURE — 84484 ASSAY OF TROPONIN QUANT: CPT | Performed by: EMERGENCY MEDICINE

## 2021-12-06 PROCEDURE — 83690 ASSAY OF LIPASE: CPT | Performed by: EMERGENCY MEDICINE

## 2021-12-06 PROCEDURE — 80053 COMPREHEN METABOLIC PANEL: CPT | Performed by: EMERGENCY MEDICINE

## 2021-12-06 PROCEDURE — 93010 ELECTROCARDIOGRAM REPORT: CPT | Mod: ,,, | Performed by: INTERNAL MEDICINE

## 2021-12-06 PROCEDURE — 93010 EKG 12-LEAD: ICD-10-PCS | Mod: ,,, | Performed by: INTERNAL MEDICINE

## 2021-12-06 PROCEDURE — 85025 COMPLETE CBC W/AUTO DIFF WBC: CPT | Performed by: EMERGENCY MEDICINE

## 2021-12-06 PROCEDURE — 81000 URINALYSIS NONAUTO W/SCOPE: CPT | Performed by: EMERGENCY MEDICINE

## 2021-12-06 PROCEDURE — 93005 ELECTROCARDIOGRAM TRACING: CPT

## 2021-12-06 PROCEDURE — 99285 EMERGENCY DEPT VISIT HI MDM: CPT | Mod: 25

## 2021-12-06 RX ORDER — DICYCLOMINE HYDROCHLORIDE 20 MG/1
20 TABLET ORAL 3 TIMES DAILY PRN
Qty: 20 TABLET | Refills: 0 | Status: SHIPPED | OUTPATIENT
Start: 2021-12-06 | End: 2022-01-05

## 2021-12-06 RX ORDER — ONDANSETRON 4 MG/1
4 TABLET, ORALLY DISINTEGRATING ORAL EVERY 8 HOURS PRN
Qty: 15 TABLET | Refills: 0 | Status: SHIPPED | OUTPATIENT
Start: 2021-12-06 | End: 2021-12-13

## 2022-10-19 ENCOUNTER — TELEPHONE (OUTPATIENT)
Dept: OPTOMETRY | Facility: CLINIC | Age: 67
End: 2022-10-19
Payer: MEDICARE

## 2022-10-19 NOTE — TELEPHONE ENCOUNTER
AUDRAM on pt's phone Mariah Martinez is a retinal specialist.  Pt would need a referral from her regular eye doctor to see him.  He only sees retinal disease.  We can schedule her for and eval with another dr who would determine if that exam would be appropriate.     ----- Message from Hector Durham sent at 10/19/2022 11:11 AM CDT -----  Contact: @[facility] [phone]  Pt is calling to schedule with  but she has no history of retinal disease and is requesting a referral. Please call back to further assist.

## 2022-12-22 ENCOUNTER — OFFICE VISIT (OUTPATIENT)
Dept: FAMILY MEDICINE | Facility: HOSPITAL | Age: 67
End: 2022-12-22
Payer: MEDICARE

## 2022-12-22 VITALS
BODY MASS INDEX: 31.99 KG/M2 | HEIGHT: 63 IN | SYSTOLIC BLOOD PRESSURE: 127 MMHG | HEART RATE: 79 BPM | WEIGHT: 180.56 LBS | DIASTOLIC BLOOD PRESSURE: 76 MMHG

## 2022-12-22 DIAGNOSIS — G56.03 BILATERAL CARPAL TUNNEL SYNDROME: ICD-10-CM

## 2022-12-22 DIAGNOSIS — M65.4 TENOSYNOVITIS, DE QUERVAIN: ICD-10-CM

## 2022-12-22 DIAGNOSIS — Z12.31 ENCOUNTER FOR SCREENING MAMMOGRAM FOR MALIGNANT NEOPLASM OF BREAST: Primary | ICD-10-CM

## 2022-12-22 DIAGNOSIS — Z13.820 SCREENING FOR OSTEOPOROSIS: ICD-10-CM

## 2022-12-22 PROCEDURE — 99213 OFFICE O/P EST LOW 20 MIN: CPT | Performed by: STUDENT IN AN ORGANIZED HEALTH CARE EDUCATION/TRAINING PROGRAM

## 2022-12-22 RX ORDER — IBUPROFEN 600 MG/1
600 TABLET ORAL EVERY 8 HOURS PRN
Qty: 30 TABLET | Refills: 0 | Status: SHIPPED | OUTPATIENT
Start: 2022-12-22 | End: 2023-01-01

## 2022-12-22 NOTE — PROGRESS NOTES
Subjective:       Patient ID: Adenike Wagner is a 67 y.o. female.    Chief Complaint: Referral (MAMMO) and Arm Pain (HAND LEFT)    66 yo F presenting for check up. Pt has been doing well since last clinic visit in April, 2021. No recent new medication or supplements. Reports doing well. Has some L hand pain for last 6 months. Pain initially on radial side of wrist, inferior to the L 1st MCP joint. Initially localized to the wrist, however has pain radiating up forearm and posterior LUE. Pain worse at night, improved with use of L hand. Has trouble grasping things with L hand, due to pain.     Also endorsing BL hand finger tingling at night for many years. However worse on L hand. No medicines tried. Does not work, however does a lot of chores around the home.     Review of Systems   Constitutional:  Negative for chills, fever and unexpected weight change.   Respiratory:  Negative for cough and shortness of breath.    Cardiovascular:  Negative for chest pain.   Gastrointestinal:  Negative for abdominal pain, nausea and vomiting.   Musculoskeletal:         L wrist pain for 6 months.     Neurological:  Positive for numbness (bilateral fingers at night.).     Objective:      Vitals:    12/22/22 1131   BP: 127/76   Pulse: 79     Physical Exam  Vitals and nursing note reviewed.   Constitutional:       General: She is not in acute distress.     Appearance: Normal appearance. She is obese. She is not ill-appearing.   Eyes:      General:         Right eye: No discharge.         Left eye: No discharge.      Conjunctiva/sclera: Conjunctivae normal.   Cardiovascular:      Rate and Rhythm: Normal rate and regular rhythm.   Pulmonary:      Effort: Pulmonary effort is normal. No respiratory distress.      Breath sounds: Normal breath sounds. No wheezing.   Abdominal:      Palpations: Abdomen is soft.      Tenderness: There is no abdominal tenderness.   Musculoskeletal:      Comments: Normal  strength in L hand, <2 second cap  refil and 2+ radial pulse. Pain in L 1st dorsal compartment of thumb with Finkelstein Test. Positive Tinel sign of L wrist. Negative Tinel and phallen of R wrist.   Neurological:      Mental Status: She is alert.   Psychiatric:         Behavior: Behavior normal.       Assessment:       1. Encounter for screening mammogram for malignant neoplasm of breast    2. Tenosynovitis, de Quervain    3. Bilateral carpal tunnel syndrome    4. Screening for osteoporosis        Plan:       Encounter for screening mammogram for malignant neoplasm of breast  -     Mammo Digital Screening Bilat; Future; Expected date: 12/22/2023    Tenosynovitis, de Quervain  -     ibuprofen (ADVIL,MOTRIN) 600 MG tablet; Take 1 tablet (600 mg total) by mouth every 8 (eight) hours as needed for Pain.  Dispense: 30 tablet; Refill: 0    Bilateral carpal tunnel syndrome    Screening for osteoporosis  -     DXA Bone Density Spine And Hip; Future; Expected date: 12/22/2022    Advised to obtain thumb spica splint for R/L wrist. Discussed ice and NSAIDs for L thumb. Advised to f/u in 2-3 months for f/u and continued HCM.     Follow up in about 2 months (around 2/22/2023).

## 2023-01-19 ENCOUNTER — HOSPITAL ENCOUNTER (OUTPATIENT)
Dept: RADIOLOGY | Facility: HOSPITAL | Age: 68
Discharge: HOME | End: 2023-01-19
Attending: STUDENT IN AN ORGANIZED HEALTH CARE EDUCATION/TRAINING PROGRAM
Payer: MEDICARE

## 2023-01-19 DIAGNOSIS — Z12.31 ENCOUNTER FOR SCREENING MAMMOGRAM FOR MALIGNANT NEOPLASM OF BREAST: ICD-10-CM

## 2023-01-19 DIAGNOSIS — Z13.820 SCREENING FOR OSTEOPOROSIS: ICD-10-CM

## 2023-01-19 PROCEDURE — 77063 MAMMO DIGITAL SCREENING BILAT WITH TOMO: ICD-10-PCS | Mod: 26,,, | Performed by: RADIOLOGY

## 2023-01-19 PROCEDURE — 77080 DEXA BONE DENSITY SPINE HIP: ICD-10-PCS | Mod: 26,,, | Performed by: RADIOLOGY

## 2023-01-19 PROCEDURE — 77067 SCR MAMMO BI INCL CAD: CPT | Mod: 26,,, | Performed by: RADIOLOGY

## 2023-01-19 PROCEDURE — 77063 BREAST TOMOSYNTHESIS BI: CPT | Mod: 26,,, | Performed by: RADIOLOGY

## 2023-01-19 PROCEDURE — 77067 SCR MAMMO BI INCL CAD: CPT | Mod: TC

## 2023-01-19 PROCEDURE — 77080 DXA BONE DENSITY AXIAL: CPT | Mod: 26,,, | Performed by: RADIOLOGY

## 2023-01-19 PROCEDURE — 77067 MAMMO DIGITAL SCREENING BILAT WITH TOMO: ICD-10-PCS | Mod: 26,,, | Performed by: RADIOLOGY

## 2023-01-19 PROCEDURE — 77080 DXA BONE DENSITY AXIAL: CPT | Mod: TC

## 2023-01-19 NOTE — PROGRESS NOTES
I assume primary medical responsibility for this patient. I have reviewed the history, physical, and assessement & treatment plan with the resident and agree that the care is reasonable and necessary. This service has been performed by a resident without the presence of a teaching physician under the primary care exception. If necessary, an addendum of additional findings or evaluation beyond the resident documentation will be noted below.     Una Mosher MD

## 2023-01-30 ENCOUNTER — OFFICE VISIT (OUTPATIENT)
Dept: FAMILY MEDICINE | Facility: HOSPITAL | Age: 68
End: 2023-01-30
Payer: MEDICARE

## 2023-01-30 VITALS
HEART RATE: 75 BPM | DIASTOLIC BLOOD PRESSURE: 83 MMHG | BODY MASS INDEX: 30.86 KG/M2 | SYSTOLIC BLOOD PRESSURE: 149 MMHG | HEIGHT: 64 IN | WEIGHT: 180.75 LBS

## 2023-01-30 DIAGNOSIS — M65.4 TENOSYNOVITIS, DE QUERVAIN: Primary | ICD-10-CM

## 2023-01-30 DIAGNOSIS — Z12.11 SCREENING FOR COLON CANCER: ICD-10-CM

## 2023-01-30 PROCEDURE — 99213 OFFICE O/P EST LOW 20 MIN: CPT | Performed by: STUDENT IN AN ORGANIZED HEALTH CARE EDUCATION/TRAINING PROGRAM

## 2023-01-30 RX ORDER — IBUPROFEN 600 MG/1
TABLET ORAL
COMMUNITY
Start: 2023-01-11 | End: 2023-01-30 | Stop reason: SDUPTHER

## 2023-01-30 RX ORDER — IBUPROFEN 600 MG/1
600 TABLET ORAL 3 TIMES DAILY PRN
Qty: 45 TABLET | Refills: 0 | Status: SHIPPED | OUTPATIENT
Start: 2023-01-30 | End: 2023-02-14

## 2023-01-30 RX ORDER — DICLOFENAC SODIUM 10 MG/G
2 GEL TOPICAL 2 TIMES DAILY
Qty: 50 G | Refills: 1 | Status: SHIPPED | OUTPATIENT
Start: 2023-01-30 | End: 2023-03-28 | Stop reason: SDUPTHER

## 2023-01-30 NOTE — PROGRESS NOTES
Subjective:       Patient ID: Adenike Wagner is a 67 y.o. female.    Chief Complaint: Wrist Pain    66 yo F presenting for f/u of carpal tunnel of both wrists, de quervain tenosynovitis. Mammogram and DXA scan f/u.  She is endorsing continued L wrist pain. She wore wrist brace and this improved symptoms of R hand. Continues to endorse wrist pain of L hand. Wears the wrist brace all day for L hand and wrist. She has been taking ibuprofen with no relief as well. 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 She is not interested in possible injections today. Numbness in fingers of R hand improved with wrist brace since last visit.    Review of Systems   Constitutional:  Negative for chills and fever.   Respiratory:  Negative for cough and shortness of breath.    Cardiovascular:  Negative for chest pain.   Gastrointestinal:  Negative for abdominal pain, nausea and vomiting.   Genitourinary:  Negative for difficulty urinating.   Musculoskeletal:         L wrist pain below 1st MCP.   Neurological:  Negative for headaches.     Objective:      Vitals:    01/30/23 1552   BP: (!) 149/83   Pulse: 75     Physical Exam  Vitals and nursing note reviewed.   Constitutional:       General: She is not in acute distress.     Appearance: Normal appearance. She is not ill-appearing.   Eyes:      General:         Right eye: No discharge.         Left eye: No discharge.      Conjunctiva/sclera: Conjunctivae normal.   Cardiovascular:      Rate and Rhythm: Normal rate and regular rhythm.   Pulmonary:      Effort: Pulmonary effort is normal. No respiratory distress.      Breath sounds: Normal breath sounds. No wheezing.   Abdominal:      Palpations: Abdomen is soft.      Tenderness: There is no abdominal tenderness.   Musculoskeletal:         General: Tenderness (to palpation of L wrist inferior to L thumb. Pain with Finkelstein's maneuver.) present.   Neurological:      Mental Status: She is alert.   Psychiatric:          Behavior: Behavior normal.       Assessment:       1. Tenosynovitis, de Quervain    2. Screening for colon cancer        Plan:       Tenosynovitis, de Quervain  -     diclofenac sodium (VOLTAREN) 1 % Gel; Apply 2 g topically 2 (two) times daily.  Dispense: 50 g; Refill: 1  -     ibuprofen (ADVIL,MOTRIN) 600 MG tablet; Take 1 tablet (600 mg total) by mouth 3 (three) times daily as needed for Pain.  Dispense: 45 tablet; Refill: 0    Screening for colon cancer  -     Case Request Endoscopy: COLONOSCOPY    Reviewed mammogram and dexa today. Negative mammogram and normal bone density on DEXA scan. Advised weight bearing exercises. Directed pt to pharmacy for flu shot today.     Colonoscopy ordered today, per chart review, pt was to repeat colonoscopy in 2018 after a poor prep. Will order today. Advised continued wrist splint for thumb, and ibuprofen PRN for pain.    Follow up in about 2 months (around 3/30/2023) for wrist pain f/u.

## 2023-02-08 ENCOUNTER — OFFICE VISIT (OUTPATIENT)
Dept: FAMILY MEDICINE | Facility: HOSPITAL | Age: 68
End: 2023-02-08
Payer: MEDICARE

## 2023-02-08 VITALS
WEIGHT: 181 LBS | DIASTOLIC BLOOD PRESSURE: 82 MMHG | HEART RATE: 80 BPM | SYSTOLIC BLOOD PRESSURE: 132 MMHG | HEIGHT: 64 IN | BODY MASS INDEX: 30.9 KG/M2

## 2023-02-08 DIAGNOSIS — Z01.818 PRE-OP EXAMINATION: Primary | ICD-10-CM

## 2023-02-08 DIAGNOSIS — M65.4 TENOSYNOVITIS, DE QUERVAIN: ICD-10-CM

## 2023-02-08 PROCEDURE — 99213 OFFICE O/P EST LOW 20 MIN: CPT | Performed by: STUDENT IN AN ORGANIZED HEALTH CARE EDUCATION/TRAINING PROGRAM

## 2023-02-08 RX ORDER — IBUPROFEN 600 MG/1
600 TABLET ORAL 3 TIMES DAILY PRN
Qty: 45 TABLET | Refills: 0 | OUTPATIENT
Start: 2023-02-08 | End: 2023-02-23

## 2023-02-08 NOTE — PROGRESS NOTES
Subjective:       Patient ID: Adenike Wagner is a 67 y.o. female.    Chief Complaint: Follow-up and PAPERWORK    66 yo F w/ PMH of HTN, presenting for pre-op evaluation for scheduled R cataract surgery scheduled on 03/06/2023.    Pt denies chest pain at rest or w/ exertion, dyspnea at rest or w/ exertion, PND, LE edema, claudication, or palpitations. Pt has no history of ischemic heart disease, CHF, CVD, diabetes, recent anticoagulant or antithrombic use, personal or family history of coagulopathy. Pt denies history of stress test, cardiac cath,or coronary revascularization. Pt reports being able to achieve >4 METs activity.    Review of Systems   Constitutional:  Negative for chills and fever.   Respiratory:  Negative for cough and shortness of breath.    Cardiovascular:  Negative for chest pain.   Gastrointestinal:  Negative for abdominal pain, nausea and vomiting.   Genitourinary:  Negative for difficulty urinating and dysuria.   Musculoskeletal:  Negative for gait problem.   Neurological:  Negative for headaches.     Objective:      Vitals:    02/08/23 1439   BP: 132/82   Pulse: 80     Physical Exam  Vitals and nursing note reviewed.   Constitutional:       General: She is not in acute distress.     Appearance: Normal appearance. She is not ill-appearing.   Eyes:      General:         Right eye: No discharge.         Left eye: No discharge.      Conjunctiva/sclera: Conjunctivae normal.   Cardiovascular:      Rate and Rhythm: Normal rate and regular rhythm.   Pulmonary:      Effort: Pulmonary effort is normal. No respiratory distress.      Breath sounds: Normal breath sounds. No wheezing.   Abdominal:      Palpations: Abdomen is soft.      Tenderness: There is no abdominal tenderness.   Neurological:      Mental Status: She is alert.   Psychiatric:         Behavior: Behavior normal.       Assessment:       1. Pre-op examination        Plan:       Pre-op examination    Evaluating Risk in Surgery is a combination  of patients risk factors and surgical risk factors. Patient is being evaluated for medical optimization and not surgical clearance. MACE is the major adverse cardiovascular event risk, and can be assessed based on the Revised Cardiac Risk Index & NSQIP RHONDA scores.    RCRI: 0-1 points, no recent CMP however no labs indicated at this time    Surgical Risks: Vascular/cardiothoracic/emergent surgeries are high risk and carry a >5% risk of MI. Head & Neck/Abdominal/Orthopedic/Prostate surgeries are intermediate risk and carry a 1-5% risk of MI. Endoscopic/Cataract/Plastic/Breast surgeries are low risk and carry <1% risk of MI.     Per ACC/AHA peter-operative guidelines, moderate risk surgery and METS >=4 is low risk for MACE and no further cardiac testing is required.     Per evaluation, patient is low risk for a low risk surgery. Patient is medically optimized for surgery at this time.     Follow up in about 3 months (around 5/8/2023).

## 2023-03-15 RX ORDER — IBUPROFEN 600 MG/1
600 TABLET ORAL EVERY 8 HOURS PRN
Qty: 21 TABLET | Refills: 0 | Status: SHIPPED | OUTPATIENT
Start: 2023-03-15 | End: 2023-03-24

## 2023-03-28 ENCOUNTER — OFFICE VISIT (OUTPATIENT)
Dept: FAMILY MEDICINE | Facility: HOSPITAL | Age: 68
End: 2023-03-28
Payer: MEDICARE

## 2023-03-28 VITALS
BODY MASS INDEX: 31.12 KG/M2 | SYSTOLIC BLOOD PRESSURE: 126 MMHG | HEIGHT: 64 IN | DIASTOLIC BLOOD PRESSURE: 71 MMHG | WEIGHT: 182.31 LBS | HEART RATE: 80 BPM

## 2023-03-28 DIAGNOSIS — M65.4 TENOSYNOVITIS, DE QUERVAIN: Primary | ICD-10-CM

## 2023-03-28 DIAGNOSIS — Z12.11 SCREENING FOR COLON CANCER: ICD-10-CM

## 2023-03-28 PROCEDURE — 99213 OFFICE O/P EST LOW 20 MIN: CPT | Performed by: STUDENT IN AN ORGANIZED HEALTH CARE EDUCATION/TRAINING PROGRAM

## 2023-03-28 RX ORDER — DICLOFENAC SODIUM 10 MG/G
2 GEL TOPICAL 2 TIMES DAILY
Qty: 50 G | Refills: 1 | Status: SHIPPED | OUTPATIENT
Start: 2023-03-28 | End: 2023-08-15 | Stop reason: SDUPTHER

## 2023-03-28 RX ORDER — IBUPROFEN 600 MG/1
600 TABLET ORAL 2 TIMES DAILY PRN
Qty: 30 TABLET | Refills: 0 | Status: SHIPPED | OUTPATIENT
Start: 2023-03-28 | End: 2023-04-12

## 2023-03-28 NOTE — PROGRESS NOTES
Subjective:      Patient ID: Adenike Wagner is a 68 y.o. female.    Chief Complaint: Annual Exam (Needs colonoscopy, shingles vaccine) and Medication Refill    69 yo F w/ PMH of HTN, presenting for f/u. DeQuervain's Tenosynovitis pain in L hand is improving somewhat. She reports taking the ibuprofen 600mg TID with very good relief. Still has some pain in L dorsal thumb. She wears the wrist brace at night and sometimes during the day. Pain is worse at night. She does not want an injection for the pain.    Review of Systems   Constitutional:  Negative for chills and fever.   Respiratory:  Negative for cough and shortness of breath.    Cardiovascular:  Negative for chest pain.   Gastrointestinal:  Negative for abdominal pain, nausea and vomiting.   Genitourinary:  Negative for difficulty urinating.   Musculoskeletal:  Negative for gait problem.   Neurological:  Negative for headaches.    Objective:     Vitals:    03/28/23 1510   BP: 126/71   Pulse: 80     Physical Exam  Vitals and nursing note reviewed.   Constitutional:       General: She is not in acute distress.     Appearance: Normal appearance. She is not ill-appearing.   Eyes:      General:         Right eye: No discharge.         Left eye: No discharge.      Conjunctiva/sclera: Conjunctivae normal.   Cardiovascular:      Rate and Rhythm: Normal rate and regular rhythm.   Pulmonary:      Effort: Pulmonary effort is normal. No respiratory distress.      Breath sounds: Normal breath sounds. No wheezing.   Abdominal:      Palpations: Abdomen is soft.      Tenderness: There is no abdominal tenderness.   Musculoskeletal:      Comments: Pain along dorsal thumb with finkelstein maneuver.   Neurological:      Mental Status: She is alert.   Psychiatric:         Behavior: Behavior normal.     Assessment:      1. Tenosynovitis, de Quervain    2. Screening for colon cancer      Plan:     Tenosynovitis, de Quervain  -     ibuprofen (ADVIL,MOTRIN) 600 MG tablet; Take 1 tablet  (600 mg total) by mouth 2 (two) times daily as needed for Pain.  Dispense: 30 tablet; Refill: 0  -     diclofenac sodium (VOLTAREN) 1 % Gel; Apply 2 g topically 2 (two) times daily.  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  Advised icing of L thumb 3x per night. If symptoms persist, will xray L hand to ensure no arthritic changes contributing to hand symptoms.    Discussed risks/benefits of chronic ibuprofen use, pt is aware of risks of GI and kidney issues, she is agreeable and aware to decreased use.     She is due for shingles vaccine today. Advised flu and shingles at pharmacy. RTC in 2 months for f/u and 2nd shingles vaccine.    Follow up in about 2 months (around 5/28/2023).

## 2023-08-15 ENCOUNTER — OFFICE VISIT (OUTPATIENT)
Dept: FAMILY MEDICINE | Facility: HOSPITAL | Age: 68
End: 2023-08-15
Payer: MEDICARE

## 2023-08-15 VITALS
SYSTOLIC BLOOD PRESSURE: 128 MMHG | BODY MASS INDEX: 31.69 KG/M2 | HEART RATE: 81 BPM | DIASTOLIC BLOOD PRESSURE: 71 MMHG | WEIGHT: 185.63 LBS | HEIGHT: 64 IN

## 2023-08-15 DIAGNOSIS — E78.5 HLD (HYPERLIPIDEMIA): ICD-10-CM

## 2023-08-15 DIAGNOSIS — Z12.11 SCREENING FOR MALIGNANT NEOPLASM OF COLON: Primary | ICD-10-CM

## 2023-08-15 DIAGNOSIS — R73.9 HYPERGLYCEMIA: ICD-10-CM

## 2023-08-15 DIAGNOSIS — Z13.1 DIABETES MELLITUS SCREENING: ICD-10-CM

## 2023-08-15 DIAGNOSIS — M65.4 TENOSYNOVITIS, DE QUERVAIN: ICD-10-CM

## 2023-08-15 DIAGNOSIS — Z13.220 LIPID SCREENING: ICD-10-CM

## 2023-08-15 DIAGNOSIS — R53.83 FATIGUE, UNSPECIFIED TYPE: ICD-10-CM

## 2023-08-15 PROCEDURE — 99213 OFFICE O/P EST LOW 20 MIN: CPT

## 2023-08-15 RX ORDER — IBUPROFEN 600 MG/1
600 TABLET ORAL EVERY 8 HOURS PRN
Qty: 40 TABLET | Refills: 0 | Status: SHIPPED | OUTPATIENT
Start: 2023-08-15 | End: 2023-11-06 | Stop reason: SDUPTHER

## 2023-08-15 RX ORDER — DICLOFENAC SODIUM 10 MG/G
2 GEL TOPICAL 2 TIMES DAILY
Qty: 50 G | Refills: 1 | Status: SHIPPED | OUTPATIENT
Start: 2023-08-15 | End: 2023-12-12

## 2023-08-15 NOTE — PROGRESS NOTES
"Progress Note  [facility]      Subjective:     69 yo F w/ PMH of HTN, presenting for pre-op evaluation for scheduled L cataract surgery scheduled on 09/11/2023. Patient was successfully able to have cataract surgery on her R eye last February without any issues. Patient requesting paperwork completion for her left eye today. No other acute complaints.      Pt denies chest pain at rest or w/ exertion, dyspnea at rest or w/ exertion, PND, LE edema, claudication, or palpitations. Pt has no history of ischemic heart disease, CHF, CVD, diabetes, recent anticoagulant or antithrombic use, personal or family history of coagulopathy. Pt denies history of stress test, cardiac cath,or coronary revascularization. Pt reports being able to achieve >4 METs activity.    #Healthcare maintenance  Patient also here today for annual labs. Per chart review patient has not had lipid or diabetic screening completed in three years. Patient also with history of deQuervain's tenosynovitis. Patient states that voltaren gel and ibuprofen prn works well for her symptoms when she has "flare ups" of her tenosynovitis. Patient also with need for follow up colonoscopy. Per chart review, patient was found to have 4 to 6 mm polyps in the distal sigmoid colon and overall inadequate prep of her colonoscopy. The recommendation was a repeat colonoscopy in 3-6 months with better prep.     Review of Systems   Constitutional:  Negative for chills, diaphoresis and fever.   Eyes:  Negative for double vision, photophobia, discharge and redness.   Respiratory:  Negative for cough, hemoptysis, shortness of breath and wheezing.    Cardiovascular:  Negative for chest pain, palpitations, leg swelling and PND.   Gastrointestinal:  Negative for abdominal pain, diarrhea, nausea and vomiting.   Genitourinary:  Negative for dysuria, flank pain and hematuria.   Musculoskeletal:  Negative for myalgias and neck pain.   Neurological:  Negative for dizziness, " weakness and headaches.   Psychiatric/Behavioral:  Negative for depression.        Objective:     Vitals:    08/15/23 0958   BP: 128/71   Pulse: 81       Wt Readings from Last 1 Encounters:   08/15/23 84.2 kg (185 lb 10 oz)       Physical Exam  Constitutional:       General: She is not in acute distress.     Appearance: Normal appearance. She is not toxic-appearing.   HENT:      Head: Normocephalic and atraumatic.      Right Ear: External ear normal.      Left Ear: External ear normal.      Nose: Nose normal.      Mouth/Throat:      Mouth: Mucous membranes are moist.   Eyes:      Extraocular Movements: Extraocular movements intact.   Cardiovascular:      Rate and Rhythm: Normal rate and regular rhythm.      Pulses: Normal pulses.      Heart sounds: Normal heart sounds.   Pulmonary:      Effort: Pulmonary effort is normal. No respiratory distress.   Abdominal:      General: Abdomen is flat.      Palpations: Abdomen is soft.      Tenderness: There is no abdominal tenderness.   Musculoskeletal:      Cervical back: Normal range of motion and neck supple.   Skin:     General: Skin is warm.      Findings: No bruising or erythema.   Neurological:      General: No focal deficit present.      Mental Status: She is alert.   Psychiatric:         Mood and Affect: Mood normal.         Assessment/Plan:     Pre-operative evaluation with risk assessment              -           Scheduled for Cataract surgery of her left eye on 9/11/2023  -           DASI score: (>4) with a revised cardiac risk index of 3.9%.    - she  is not on antiplatelets/anticoagulation.   - she does not have a history of blood dyscrasias or previous reaction to anesthesia   - she  is a NEVER SMOKER.  - she does have a prior h/o HTN. BP: 128/71.   - she does not have a prior h/o HLD/CAD w/ either MI or CVA. See below for latest ASCVD if all necessary values are available. Continue medical management.  - she does not have a prior h/o DM. See below for last  A1C.  - she does not have a prior h/o thyroid disease.See below for last TSH.   - she does not have a prior h/o COPD/Asthma/KATY/OHS. does not use CPAP. does not have changes from baseline function.   - she does not have a prior h/o HF..  - BMI: Body mass index is 31.86 kg/m².               -           The patient is medically optimized with a low to moderate risk to proceed with (per ACC/AHA peter-operative guidelines) a moderate risk surgery.   - Please call our office for any additional questions or concerns.    Paperwork filled out with documented physical exam and requested laboratory work. Faxed to opthalmology clinic today. Patient also with need for updated lipid screening and diabetic screening and labs ordered today. Patient with last colonoscopy completed in 2018 with recommendation of repeat in 3-6 months however this was never completed by patient. Colonoscopy ordered today. Voltaren gel and ibuprofen prn for tenosynovitis pain.     The 10-year ASCVD risk score (Domenico DK, et al., 2019) is: 6.4%    Values used to calculate the score:      Age: 68 years      Sex: Female      Is Non- : No      Diabetic: No      Tobacco smoker: No      Systolic Blood Pressure: 128 mmHg      Is BP treated: No      HDL Cholesterol: 64 mg/dL      Total Cholesterol: 238 mg/dL  Lab Results   Component Value Date    HGBA1C 5.4 08/15/2023      Lab Results   Component Value Date    TSH 0.960 08/15/2023              Case discussed with staff: Dr. Bam Hernández MD PGY-3  [facility]   08/31/2023 10:02 AM    This note was partially created using [employer] Voice Recognition software. Typographical and content errors may occur with this process. While efforts are made to detect and correct such errors, in some cases errors will persist. For this reason, wording in this document should be considered in the proper context and not strictly verbatim.

## 2023-08-31 NOTE — PROGRESS NOTES
I assume primary medical responsibility for this patient. I have reviewed the history, physical, and assessement & treatment plan with the resident and agree that the care is reasonable and necessary. This service has been performed by a resident without the presence of a teaching physician under the primary care exception. If necessary, an addendum of additional findings or evaluation beyond the resident documentation will be noted below.      Vanna Kuhn MD    [facility]

## 2023-09-25 ENCOUNTER — TELEPHONE (OUTPATIENT)
Dept: GASTROENTEROLOGY | Facility: CLINIC | Age: 68
End: 2023-09-25
Payer: MEDICARE

## 2023-09-25 NOTE — TELEPHONE ENCOUNTER
Jaya  ID# [id number]  Left message for patient to call the scheduling line to schedule a colonoscopy.

## 2023-10-12 ENCOUNTER — TELEPHONE (OUTPATIENT)
Dept: GASTROENTEROLOGY | Facility: CLINIC | Age: 68
End: 2023-10-12
Payer: MEDICARE

## 2023-10-13 ENCOUNTER — TELEPHONE (OUTPATIENT)
Dept: GASTROENTEROLOGY | Facility: CLINIC | Age: 68
End: 2023-10-13
Payer: MEDICARE

## 2023-10-13 NOTE — TELEPHONE ENCOUNTER
Endoscopy Scheduling Questionnaire:         Are you taking any blood thinners? no               If Yes  Have you been on them for longer than one year? N/a    2. Have you been diagnosed with Diverticulitis in past three months?  no    3. Are you on dialysis or have Kidney Disease? no    4. Previous Colonoscopy?  yes         If yes Do you have a history of colon polyps?  yes    5. Family History of Colon Cancer: yes         Relation: Sister       Age at Diagnosis: 67      6. Are you a diabetic?  no    7. Do you have a history of constipation?  no    8. Are you taking a GLP-1 Agonist/Adipex (weight loss drugs)? no  Dulaglutide (Trulicity) (weekly)  Exenatide extended release (Bydureon bcise) (weekly)  Exenatide (Byetta) (twice daily)  Semaglutide (Ozempic) (weekly)  Liraglutide (Victoza, Saxenda) (daily)  Lixisenatide (Adlyxin) (daily)  Semaglutide (Rybelsus) (taken by mouth once daily)    Hold GLP-1 agonists on the day of the procedure/surgery for patients who take the medication daily.    Hold GLP-1 agonists a week prior to the procedure/surgery for patients who take the medication weekly.    Procedure scheduled with Dr. Jackson  on  12/1/2023    The prep being used is Clenpiq     The patient's prep instructions were sent via mail.        CLENPIQ Instructions    You are scheduled for a colonoscopy with Dr. Jackson on 12/1/2023 at [facility] located at [address] [place] [address].  Check in at the admit desk, first floor of the hospital (which is the building on the left).     You will receive a call 2-3 days before your colonoscopy to tell you the time to arrive.  If you have not received a call by the day before your procedure, call the Endoscopy Lab at [phone].    To ensure that your test is accurate and complete, you MUST follow these instructions listed below.  If you have any questions, please call our office at [phone].  Plan on being at the hospital for your procedure for 3-4 hours. Please  contact the office at [phone] two days prior to procedure date if reschedule is needed.    1.  Follow a CLEAR LIQUID DIET for the entire day before your scheduled colonoscopy.  This means no solid food the entire day starting when you wake.  You may have as much of the clear liquids as you want throughout the day.   CLEAR LIQUID DIET:   - Avoid Red, Orange, Purple, and/or Blue food coloring   - NO DAIRY   - You can have:  Coffee with sugar (no creamer), tea, water, soda, apple or white grape juice, chicken or beef broth/bouillon (no meat, noodles, or veggies), green/yellow popsicles, green/yellow Jell-O, lemonade.    2.  AT 5 pm the evening before your colonoscopy, OPEN ONE (1) BOTTLE OF CLENPIQ AND DRINK THE ENTIRE BOTTLE.  DRINK FIVE (5) 8-OUNCE GLASSES OF WATER (40 OUNCES TOTAL) OVER THE NEXT FIVE (5) HOURS.     3.  The endoscopy department will call you 2 days before your colonoscopy to tell you the exact time to arrive, AND to tell you the exact time to drink the 2nd portion of your prep (which will be FIVE HOURS BEFORE YOUR ARRIVAL TIME).  At this time given to you, OPEN THE OTHER ONE (1) BOTTLE OF CLENPIQ AND DRINK THE ENTIRE BOTTLE.  DRINK THREE (3) 8-OUNCE GLASSES OF WATER (24 OUNCES TOTAL) OVER THE NEXT THREE (3) HOURS. Once this is complete, you can not have anything else by mouth!    4.  You must have someone with you to DRIVE YOU HOME since you will be receiving IV sedation for the colonoscopy.    5.  It is ok to take your heart, blood pressure, and seizure medications in the morning of your test with a SIP of water.  Hold other medications until after your procedure.  Do NOT have anything else to eat or drink the morning of your colonoscopy.  It is ok to brush your teeth.    6.  If you are on blood thinners THAT YOU HAVE BEEN INSTRUCTED TO HOLD BY YOUR DOCTOR FOR THIS PROCEDURE, then do NOT take this the morning of your colonoscopy.  Do NOT stop these medications on your own, they must be approved  to be held by your doctor.  Your colonoscopy can NOT be done if you are on these medications.  Examples of blood thinners include: Coumadin, Aggrenox, Plavix, Pradaxa, Reapro, Pletal, Xarelto, Ticagrelor, Brilinta, Eliquis, and high dose aspirin (325 mg).  You do not have to stop baby aspirin 81 mg.    7.  IF YOU ARE DIABETIC:  NO INSULIN OR ORAL MEDICATIONS THE MORNING OF THE COLONOSCOPY.  TAKE ONLY HALF THE DOSE OF YOUR INSULIN THE DAY BEFORE THE COLONOSCOPY.  DO NOT TAKE ANY ORAL DIABETIC MEDICATIONS THE DAY BEFORE THE COLONOSCOPY.  IF YOU ARE AN INSULIN DEPENDENT DIABETIC WITH UNSTABLE BLOOD SUGARS, NOTIFY YOUR PRIMARY CARE PHYSICIAN FOR INSTRUCTIONS.    8. Please hold any GLP-1 medications prior to the procedure: Dulaglutide Trulicity(hold week prior), Exenatide Byetta (hold the morning of procedure), Semaglutide Ozempic (hold week prior), Liraglutide Victoza, Saxenda(hold week prior), Lixisenatide Adlyxin (hold the morning of procedure), Semaglutide Rybelsus (hold the morning of procedure), Tirzepatide Mounjaro (hold week prior)

## 2023-10-15 RX ORDER — SODIUM PICOSULFATE, MAGNESIUM OXIDE, AND ANHYDROUS CITRIC ACID 12; 3.5; 1 G/175ML; G/175ML; MG/175ML
1 LIQUID ORAL ONCE
Qty: 350 ML | Refills: 0 | Status: SHIPPED | OUTPATIENT
Start: 2023-10-15 | End: 2023-10-19

## 2023-10-23 ENCOUNTER — OFFICE VISIT (OUTPATIENT)
Dept: FAMILY MEDICINE | Facility: HOSPITAL | Age: 68
End: 2023-10-23
Payer: MEDICARE

## 2023-10-23 ENCOUNTER — LAB VISIT (OUTPATIENT)
Dept: LAB | Facility: HOSPITAL | Age: 68
End: 2023-10-23
Payer: MEDICARE

## 2023-10-23 VITALS
BODY MASS INDEX: 31.24 KG/M2 | WEIGHT: 183 LBS | DIASTOLIC BLOOD PRESSURE: 78 MMHG | HEIGHT: 64 IN | HEART RATE: 74 BPM | SYSTOLIC BLOOD PRESSURE: 133 MMHG

## 2023-10-23 DIAGNOSIS — R10.84 GENERALIZED ABDOMINAL PAIN: Primary | ICD-10-CM

## 2023-10-23 DIAGNOSIS — E78.1 HYPERTRIGLYCERIDEMIA: ICD-10-CM

## 2023-10-23 DIAGNOSIS — R10.84 GENERALIZED ABDOMINAL PAIN: ICD-10-CM

## 2023-10-23 DIAGNOSIS — E78.00 HIGH CHOLESTEROL: ICD-10-CM

## 2023-10-23 LAB
ALBUMIN SERPL BCP-MCNC: 4.3 G/DL (ref 3.5–5.2)
ALP SERPL-CCNC: 113 U/L (ref 55–135)
ALT SERPL W/O P-5'-P-CCNC: 12 U/L (ref 10–44)
ANION GAP SERPL CALC-SCNC: 11 MMOL/L (ref 8–16)
AST SERPL-CCNC: 15 U/L (ref 10–40)
BILIRUB SERPL-MCNC: 0.4 MG/DL (ref 0.1–1)
BUN SERPL-MCNC: 20 MG/DL (ref 8–23)
CALCIUM SERPL-MCNC: 10.3 MG/DL (ref 8.7–10.5)
CHLORIDE SERPL-SCNC: 106 MMOL/L (ref 95–110)
CO2 SERPL-SCNC: 26 MMOL/L (ref 23–29)
CREAT SERPL-MCNC: 0.9 MG/DL (ref 0.5–1.4)
EST. GFR  (NO RACE VARIABLE): >60 ML/MIN/1.73 M^2
GLUCOSE SERPL-MCNC: 95 MG/DL (ref 70–110)
LIPASE SERPL-CCNC: 23 U/L (ref 4–60)
POTASSIUM SERPL-SCNC: 4.6 MMOL/L (ref 3.5–5.1)
PROT SERPL-MCNC: 8.5 G/DL (ref 6–8.4)
SODIUM SERPL-SCNC: 143 MMOL/L (ref 136–145)

## 2023-10-23 PROCEDURE — 86677 HELICOBACTER PYLORI ANTIBODY: CPT

## 2023-10-23 PROCEDURE — 86364 TISS TRNSGLTMNASE EA IG CLAS: CPT | Mod: 59

## 2023-10-23 PROCEDURE — 83690 ASSAY OF LIPASE: CPT

## 2023-10-23 PROCEDURE — 80053 COMPREHEN METABOLIC PANEL: CPT

## 2023-10-23 PROCEDURE — 99213 OFFICE O/P EST LOW 20 MIN: CPT

## 2023-10-23 NOTE — PROGRESS NOTES
History & Physical  [facility]      SUBJECTIVE:     History of Present Illness:  Patient is a 68 y.o. female. Presents to clinic for constant abdominal pain.      Abdominal pain  Patient complaining of 4 day history of severe, abdominal pain that begins on her right side and radiates to the left side of her abdomen. Now resolved. She reports a history of similar abdominal pain of less severity for 1-2 days about every 1-2 months for the last several years. She reports associated bloating, heartburn, and diarrhea and need to go to bathroom almost immediately after she eats. No noticeable foods that trigger these episodes. This episode was more severe and although she feels better she wants to know why she is having these episodes. She has been seen in the past for this abdominal pain and was diagnosed with gastroenteritis, given pepcid and maalox which has helped to resolve issues before they reappear a month later. She reports taking ibuprofen 800mg twice a day for 2 days which she was prescribed in the past for now resolved knee pain has helped to resolve pain. Otherwise denies daily NSAID use, denies smoking history. History of recent travel to [facility] 9/21-10/5. She travels to [facility] a few times a year.    Patient has colonoscopy scheduled 12/1/23.  Denies N/V, hematochezia, fever, chills.    Abnormal Lipid panel  Cholesterol 238, triglyceride 224 on lipid panel  completed after last visit on 8/15/2023  The 10-year ASCVD risk score (Domenico VIDAL, et al., 2019) is: 6.8%    Values used to calculate the score:      Age: 68 years      Sex: Female      Is Non- : No      Diabetic: No      Tobacco smoker: No      Systolic Blood Pressure: 133 mmHg      Is BP treated: No      HDL Cholesterol: 64 mg/dL      Total Cholesterol: 238 mg/dL      ROS  A 10+ review of systems was performed with pertinent positives and negatives noted above in the history of present illness.  Other systems were  "negative unless otherwise specified.    Review of patient's allergies indicates:  No Known Allergies    Past Medical History:   Diagnosis Date    Cataract     Hypertension        Current Outpatient Medications   Medication Instructions    diclofenac sodium (VOLTAREN) 2 g, Topical (Top), 2 times daily    ibuprofen (ADVIL,MOTRIN) 600 mg, Oral, Every 8 hours PRN       Past Surgical History:   Procedure Laterality Date    COLONOSCOPY N/A 6/7/2018    Procedure: COLONOSCOPY;  Surgeon: Mikayla Trivedi MD;  Location: [facility];  Service: Endoscopy;  Laterality: N/A;  aborted, inadequate prep    COLONOSCOPY N/A 6/14/2018    Procedure: COLONOSCOPY;  Surgeon: Mikayla Trivedi MD;  Location: [facility];  Service: Endoscopy;  Laterality: N/A;       No family history on file.    Social History     Tobacco Use    Smoking status: Never    Smokeless tobacco: Never   Substance Use Topics    Alcohol use: No    Drug use: No        OBJECTIVE:     Vital Signs (Most Recent)  Vitals:    10/23/23 1313   BP: 133/78   Pulse: 74   Weight: 83 kg (182 lb 15.7 oz)   Height: 5' 4" (1.626 m)     BMI: Body mass index is 31.41 kg/m².    Physical Exam:  Physical Exam  Constitutional:       Appearance: Normal appearance. She is obese.   Cardiovascular:      Rate and Rhythm: Normal rate and regular rhythm.      Pulses: Normal pulses.      Heart sounds: Normal heart sounds. No murmur heard.  Pulmonary:      Effort: Pulmonary effort is normal. No respiratory distress.      Breath sounds: No stridor. No wheezing, rhonchi or rales.   Chest:      Chest wall: No tenderness.   Abdominal:      General: Abdomen is flat. Bowel sounds are normal. There is no distension.      Palpations: Abdomen is soft. There is no mass.      Tenderness: There is abdominal tenderness (to deep palpation RUQ, LUQ). There is no right CVA tenderness, left CVA tenderness, guarding or rebound.      Hernia: No hernia is present.   Neurological:      Mental Status: She is alert.    "     Labs  Hemoglobin A1C   Date Value Ref Range Status   08/15/2023 5.4 4.0 - 5.6 % Final     Comment:     ADA Screening Guidelines:  5.7-6.4%  Consistent with prediabetes  >or=6.5%  Consistent with diabetes    High levels of fetal hemoglobin interfere with the HbA1C  assay. Heterozygous hemoglobin variants (HbS, HgC, etc)do  not significantly interfere with this assay.   However, presence of multiple variants may affect accuracy.     09/03/2020 5.2 4.0 - 5.6 % Final     Comment:     ADA Screening Guidelines:  5.7-6.4%  Consistent with prediabetes  >or=6.5%  Consistent with diabetes  High levels of fetal hemoglobin interfere with the HbA1C  assay. Heterozygous hemoglobin variants (HbS, HgC, etc)do  not significantly interfere with this assay.   However, presence of multiple variants may affect accuracy.     02/21/2014 5.4 4.5 - 6.2 % Final     TSH   Date Value Ref Range Status   08/15/2023 0.960 0.400 - 4.000 uIU/mL Final        ASSESSMENT/PLAN:   68 y.o.female presents to clinic for    Generalized abdominal pain  Patient with history of intermittent abdominal pain with episodes lastings 1-4 days every 1-2 months for the past several years. Differential includes cholelithiasis, Cholecystitis, H. Pylori, GERD, PUD, celiac v. IBS. Less likely pancreatitis. Ordered labs and imaging below. Patient has colonoscopy scheduled for 12/1/2023.     -     Comprehensive Metabolic Panel; Future; Expected date: 10/23/2023  -     Tissue Transglutaminase Abs, IgA/IgG; Future; Expected date: 10/23/2023  -     Lipase; Future; Expected date: 10/23/2023  -     Stool Exam-Ova,Cysts,Parasites; Future; Expected date: 10/23/2023  -     H. pylori antigen, stool; Future; Expected date: 10/23/2023  -     H. pylori Antibody, IgG; Future; Expected date: 10/23/2023  -     US Abdomen Limited; Future; Expected date: 10/23/2023  - Discussed avoiding NSAIDs to prevent further injury if pain 2/2 peptic ulcer disease.  - Discussed holding off on  omeprazole and pepcid until H. Pylori result returns.     High cholesterol  Hypertriglyceridemia  The 10-year ASCVD risk score (Domenico DK, et al., 2019) is: 6.8%    Values used to calculate the score:      Age: 68 years      Sex: Female      Is Non- : No      Diabetic: No      Tobacco smoker: No      Systolic Blood Pressure: 133 mmHg      Is BP treated: No      HDL Cholesterol: 64 mg/dL      Total Cholesterol: 238 mg/dL    - Discussed ways to lower triglyceride and cholesterol including limiting red meat and food high in saturated fats and sugar. Incorporating more vegetables in diet and foods low in carbs.     - Counseled patient on importance of exercising at least 150-300 minutes per week (i.e. at least 30 minutes, 3 days a week) of moderate physical activity.    - Counseled patient on the importance maintaining a healthy diet (including at least one-half of food eaten should be whole fruits and vegetables with the core elements of the other half of food  should include grains, whole grains, dairy, protein, and oils with lower saturated fat, as well as minimizing alcohol use and consumption of foods with added sugar, saturated fat, and sodium.)     BMI 31.41  - plan as in high trglycerides/high cholesterol     Provided patient with anticipatory guidance and return precautions. Treatment plan discussed with patient, all questions answered, and patient acknowledged understanding and verbal agreement.      Follow-up in: 6 months. Or sooner PRN as acute concerns arise.         Medical decision making straight forward and not complex during this visit.       Case discussed with Dr. SHANE Kuhn.    ________________________  Lakisha Garza M.D.  [facility] PGY-1  10/23/2023 1:21 PM

## 2023-10-24 LAB — H PYLORI IGG SERPL QL IA: NEGATIVE

## 2023-10-26 LAB
TTG IGA SER-ACNC: 0.6 U/ML
TTG IGG SER-ACNC: <0.6 U/ML

## 2023-11-02 ENCOUNTER — HOSPITAL ENCOUNTER (OUTPATIENT)
Dept: RADIOLOGY | Facility: HOSPITAL | Age: 68
Discharge: HOME | End: 2023-11-02
Payer: MEDICARE

## 2023-11-02 DIAGNOSIS — R10.84 GENERALIZED ABDOMINAL PAIN: ICD-10-CM

## 2023-11-02 PROCEDURE — 76705 ECHO EXAM OF ABDOMEN: CPT | Mod: TC

## 2023-11-02 PROCEDURE — 76705 ECHO EXAM OF ABDOMEN: CPT | Mod: 26,,, | Performed by: RADIOLOGY

## 2023-11-02 PROCEDURE — 76705 US ABDOMEN LIMITED: ICD-10-PCS | Mod: 26,,, | Performed by: RADIOLOGY

## 2023-11-03 ENCOUNTER — LAB VISIT (OUTPATIENT)
Dept: LAB | Facility: HOSPITAL | Age: 68
End: 2023-11-03
Payer: MEDICARE

## 2023-11-03 DIAGNOSIS — R10.84 GENERALIZED ABDOMINAL PAIN: ICD-10-CM

## 2023-11-03 PROCEDURE — 87338 HPYLORI STOOL AG IA: CPT

## 2023-11-03 PROCEDURE — 87209 SMEAR COMPLEX STAIN: CPT

## 2023-11-06 ENCOUNTER — TELEPHONE (OUTPATIENT)
Dept: ADMINISTRATIVE | Facility: OTHER | Age: 68
End: 2023-11-06
Payer: MEDICARE

## 2023-11-06 ENCOUNTER — TELEPHONE (OUTPATIENT)
Dept: FAMILY MEDICINE | Facility: HOSPITAL | Age: 68
End: 2023-11-06
Payer: MEDICARE

## 2023-11-06 DIAGNOSIS — M65.4 TENOSYNOVITIS, DE QUERVAIN: ICD-10-CM

## 2023-11-06 DIAGNOSIS — K80.50 RECURRENT BILIARY COLIC: ICD-10-CM

## 2023-11-06 DIAGNOSIS — K80.20 CALCULUS OF GALLBLADDER WITHOUT CHOLECYSTITIS WITHOUT OBSTRUCTION: Primary | ICD-10-CM

## 2023-11-06 LAB — O+P STL MICRO: NORMAL

## 2023-11-06 RX ORDER — IBUPROFEN 600 MG/1
600 TABLET ORAL EVERY 8 HOURS PRN
Qty: 40 TABLET | Refills: 0 | Status: SHIPPED | OUTPATIENT
Start: 2023-11-06 | End: 2023-12-12

## 2023-11-06 RX ORDER — IBUPROFEN 600 MG/1
600 TABLET ORAL EVERY 8 HOURS PRN
Qty: 40 TABLET | Refills: 0 | Status: CANCELLED | OUTPATIENT
Start: 2023-11-06

## 2023-11-06 NOTE — TELEPHONE ENCOUNTER
Spoke with patient via  services as patient's primary language is Kosovan. Informed patient of lab results and US result. Informed her of high suspicion of her recurrent abdominal pain that has been increasing in frequency and severity is likely due to gallstones. Per chart review, patient has had gallstones in past (2016) of smaller size compared to recent US (2.7 cm). Will refer to general surgery to discuss elective cholecystectomy v. medical management. Informed her that she may resume her ibuprofen as needed during episodes of pain and to limit to every 8 hours as prescribed. Also advised to limit foods high in fat and to decrease portion sizes to prevent episodes of pain. ED precautions given including if worsening abdominal pain, fever, vomiting.  All questions answered and patient expressed understanding.    ________________________  Lakisha Garza MD  [facility] PGY-1

## 2023-11-07 NOTE — PROGRESS NOTES
I assume primary medical responsibility for this patient. I have reviewed the history, physical, and assessement & treatment plan with the resident and agree that the care is reasonable and necessary. This service has been performed by a resident with the presence of a teaching physician under the primary care exception. If necessary, an addendum of additional findings or evaluation beyond the resident documentation will be noted below.    Follow-up labs and imaging. Chronic disease management provided.      Vanna Kuhn MD    [facility]

## 2023-11-10 LAB — H PYLORI AG STL QL IA: NOT DETECTED

## 2023-11-29 ENCOUNTER — TELEPHONE (OUTPATIENT)
Dept: ENDOSCOPY | Facility: HOSPITAL | Age: 68
End: 2023-11-29
Payer: MEDICARE

## 2023-11-29 NOTE — TELEPHONE ENCOUNTER
Mailbox full, unable to leave voice message.  Left text message instructing patient to call dept @ [phone] between 8am-3pm.    Arrival time to be given @ 0039  Colon/Clenpiq (Inst mailed 10/13)  (Message sent via [facility] portal)

## 2023-11-29 NOTE — TELEPHONE ENCOUNTER
Spoke with patient using LL  (Lizz, ID #[id number]) about arrival time @ 0745.   Colon/Clenpiq    Prep instructions reviewed: the day before the procedure, follow a clear liquid diet all day, then start the first 1/2 of prep at 5pm and take 2nd 1/2 of prep @ 0245.  Pt must be completely NPO when prep completed @ 0445.              Medications: Do not take Insulin or oral diabetic medications the day of the procedure.  Take as prescribed: heart, seizure and blood pressure medication in the morning with a sip of water (less than an ounce).  Take any breathing medications and bring inhalers to hospital with you Leave all valuables and jewelry at home.     Wear comfortable clothes to procedure to change into hospital gown You cannot drive for 24 hours after your procedure because you will receive sedation for your procedure to make you comfortable.  A ride must be provided at discharge.

## 2023-11-30 ENCOUNTER — ANESTHESIA EVENT (OUTPATIENT)
Dept: ENDOSCOPY | Facility: HOSPITAL | Age: 68
End: 2023-11-30
Payer: MEDICARE

## 2023-11-30 NOTE — ANESTHESIA PREPROCEDURE EVALUATION
11/30/2023  Adenike Wagner is a 68 y.o., female.      Pre-op Assessment    I have reviewed the Patient Summary Reports.          Review of Systems  Anesthesia Hx:   History of prior surgery of interest to airway management or planning:             Hematology/Oncology:  Hematology Normal   Oncology Normal                                   EENT/Dental:  EENT/Dental Normal           Cardiovascular:     Hypertension                                  Hypertension         Renal/:  Renal/ Normal                 Neurological:    Neuromuscular Disease,                                 Neuromuscular Disease       Physical Exam  General: Well nourished, Cooperative, Alert and Oriented    Airway:  Mallampati: II   Mouth Opening: Normal  TM Distance: Normal  Tongue: Normal  Neck ROM: Normal ROM        Anesthesia Plan  Type of Anesthesia, risks & benefits discussed:    Anesthesia Type: Gen Natural Airway  Intra-op Monitoring Plan: Standard ASA Monitors  Induction:  IV  Informed Consent: Informed consent signed with the Patient and all parties understand the risks and agree with anesthesia plan.  All questions answered.   ASA Score: 3  Day of Surgery Review of History & Physical: H&P Update referred to the surgeon/provider.    Ready For Surgery From Anesthesia Perspective.     .

## 2023-12-01 ENCOUNTER — ANESTHESIA (OUTPATIENT)
Dept: ENDOSCOPY | Facility: HOSPITAL | Age: 68
End: 2023-12-01
Payer: MEDICARE

## 2023-12-01 ENCOUNTER — TELEPHONE (OUTPATIENT)
Dept: GASTROENTEROLOGY | Facility: HOSPITAL | Age: 68
End: 2023-12-01
Payer: MEDICARE

## 2023-12-01 ENCOUNTER — TELEPHONE (OUTPATIENT)
Dept: SURGERY | Facility: CLINIC | Age: 68
End: 2023-12-01
Payer: MEDICARE

## 2023-12-01 ENCOUNTER — HOSPITAL ENCOUNTER (OUTPATIENT)
Facility: HOSPITAL | Age: 68
Discharge: HOME | End: 2023-12-01
Attending: INTERNAL MEDICINE | Admitting: INTERNAL MEDICINE
Payer: MEDICARE

## 2023-12-01 VITALS
TEMPERATURE: 98 F | WEIGHT: 180 LBS | RESPIRATION RATE: 16 BRPM | OXYGEN SATURATION: 97 % | HEIGHT: 64 IN | BODY MASS INDEX: 30.73 KG/M2 | HEART RATE: 60 BPM | SYSTOLIC BLOOD PRESSURE: 141 MMHG | DIASTOLIC BLOOD PRESSURE: 69 MMHG

## 2023-12-01 DIAGNOSIS — Z12.11 COLON CANCER SCREENING: ICD-10-CM

## 2023-12-01 PROCEDURE — 63600175 PHARM REV CODE 636 W HCPCS: Performed by: NURSE ANESTHETIST, CERTIFIED REGISTERED

## 2023-12-01 PROCEDURE — 37000009 HC ANESTHESIA EA ADD 15 MINS: Performed by: INTERNAL MEDICINE

## 2023-12-01 PROCEDURE — D9220A PRA ANESTHESIA: Mod: PT,CRNA,, | Performed by: NURSE ANESTHETIST, CERTIFIED REGISTERED

## 2023-12-01 PROCEDURE — 45385 COLONOSCOPY W/LESION REMOVAL: CPT | Mod: PT | Performed by: INTERNAL MEDICINE

## 2023-12-01 PROCEDURE — 88305 TISSUE EXAM BY PATHOLOGIST: CPT | Performed by: PATHOLOGY

## 2023-12-01 PROCEDURE — 37000008 HC ANESTHESIA 1ST 15 MINUTES: Performed by: INTERNAL MEDICINE

## 2023-12-01 PROCEDURE — 25000003 PHARM REV CODE 250: Performed by: NURSE ANESTHETIST, CERTIFIED REGISTERED

## 2023-12-01 PROCEDURE — 88305 TISSUE EXAM BY PATHOLOGIST: ICD-10-PCS | Mod: 26,,, | Performed by: PATHOLOGY

## 2023-12-01 PROCEDURE — D9220A PRA ANESTHESIA: Mod: PT,ANES,, | Performed by: ANESTHESIOLOGY

## 2023-12-01 PROCEDURE — 88305 TISSUE EXAM BY PATHOLOGIST: CPT | Mod: 26,,, | Performed by: PATHOLOGY

## 2023-12-01 PROCEDURE — 45385 PR COLONOSCOPY,REMV LESN,SNARE: ICD-10-PCS | Mod: PT,,, | Performed by: INTERNAL MEDICINE

## 2023-12-01 PROCEDURE — 27201089 HC SNARE, DISP (ANY): Performed by: INTERNAL MEDICINE

## 2023-12-01 PROCEDURE — 45385 COLONOSCOPY W/LESION REMOVAL: CPT | Mod: PT,,, | Performed by: INTERNAL MEDICINE

## 2023-12-01 PROCEDURE — D9220A PRA ANESTHESIA: ICD-10-PCS | Mod: PT,CRNA,, | Performed by: NURSE ANESTHETIST, CERTIFIED REGISTERED

## 2023-12-01 PROCEDURE — D9220A PRA ANESTHESIA: ICD-10-PCS | Mod: PT,ANES,, | Performed by: ANESTHESIOLOGY

## 2023-12-01 RX ORDER — PROPOFOL 10 MG/ML
VIAL (ML) INTRAVENOUS CONTINUOUS PRN
Status: DISCONTINUED | OUTPATIENT
Start: 2023-12-01 | End: 2023-12-01

## 2023-12-01 RX ORDER — SODIUM CHLORIDE 9 MG/ML
INJECTION, SOLUTION INTRAVENOUS CONTINUOUS
Status: DISCONTINUED | OUTPATIENT
Start: 2023-12-01 | End: 2023-12-01 | Stop reason: HOSPADM

## 2023-12-01 RX ORDER — PROPOFOL 10 MG/ML
VIAL (ML) INTRAVENOUS
Status: DISCONTINUED | OUTPATIENT
Start: 2023-12-01 | End: 2023-12-01

## 2023-12-01 RX ORDER — SODIUM CHLORIDE 0.9 % (FLUSH) 0.9 %
10 SYRINGE (ML) INJECTION ONCE
Status: DISCONTINUED | OUTPATIENT
Start: 2023-12-01 | End: 2023-12-01 | Stop reason: HOSPADM

## 2023-12-01 RX ORDER — LIDOCAINE HYDROCHLORIDE 20 MG/ML
INJECTION INTRAVENOUS
Status: DISCONTINUED | OUTPATIENT
Start: 2023-12-01 | End: 2023-12-01

## 2023-12-01 RX ADMIN — PROPOFOL 80 MG: 10 INJECTION, EMULSION INTRAVENOUS at 08:12

## 2023-12-01 RX ADMIN — PROPOFOL 150 MCG/KG/MIN: 10 INJECTION, EMULSION INTRAVENOUS at 08:12

## 2023-12-01 RX ADMIN — SODIUM CHLORIDE: 0.9 INJECTION, SOLUTION INTRAVENOUS at 08:12

## 2023-12-01 RX ADMIN — LIDOCAINE HYDROCHLORIDE 50 MG: 20 INJECTION, SOLUTION INTRAVENOUS at 08:12

## 2023-12-01 NOTE — LETTER
October 13, 2023    Adenike Wagner  [address] [place] [address]                [address] [place], [place] [address]JENNIFER  [facility] [address]  Phone: [phone]  Fax: [phone] Dear Ms. Wagner:    VANESSAIQ Instructions    You are scheduled for a colonoscopy with Dr. Jackson on 12/1/2023 at [facility] located at [address] [place] [address].  Check in at the admit desk, first floor of the hospital (which is the building on the left).     You will receive a call 2-3 days before your colonoscopy to tell you the time to arrive.  If you have not received a call by the day before your procedure, call the Endoscopy Lab at [phone].    To ensure that your test is accurate and complete, you MUST follow these instructions listed below.  If you have any questions, please call our office at [phone].  Plan on being at the hospital for your procedure for 3-4 hours. Please contact the office at [phone] two days prior to procedure date if reschedule is needed.    1.  Follow a CLEAR LIQUID DIET for the entire day before your scheduled colonoscopy.  This means no solid food the entire day starting when you wake.  You may have as much of the clear liquids as you want throughout the day.   CLEAR LIQUID DIET:   - Avoid Red, Orange, Purple, and/or Blue food coloring   - NO DAIRY   - You can have:  Coffee with sugar (no creamer), tea, water, soda, apple or white grape juice, chicken or beef broth/bouillon (no meat, noodles, or veggies), green/yellow popsicles, green/yellow Jell-O, lemonade.    2.  AT 5 pm the evening before your colonoscopy, OPEN ONE (1) BOTTLE OF CLENPIQ AND DRINK THE ENTIRE BOTTLE.  DRINK FIVE (5) 8-OUNCE GLASSES OF WATER (40 OUNCES TOTAL) OVER THE NEXT FIVE (5) HOURS.     3.  The endoscopy department will call you 2 days before your colonoscopy to tell you the exact time to arrive, AND to tell you the exact time to drink the 2nd portion of your prep (which will be FIVE HOURS BEFORE YOUR ARRIVAL  TIME).  At this time given to you, OPEN THE OTHER ONE (1) BOTTLE OF CLENPIQ AND DRINK THE ENTIRE BOTTLE.  DRINK THREE (3) 8-OUNCE GLASSES OF WATER (24 OUNCES TOTAL) OVER THE NEXT THREE (3) HOURS. Once this is complete, you can not have anything else by mouth!    4.  You must have someone with you to DRIVE YOU HOME since you will be receiving IV sedation for the colonoscopy.    5.  It is ok to take your heart, blood pressure, and seizure medications in the morning of your test with a SIP of water.  Hold other medications until after your procedure.  Do NOT have anything else to eat or drink the morning of your colonoscopy.  It is ok to brush your teeth.    6.  If you are on blood thinners THAT YOU HAVE BEEN INSTRUCTED TO HOLD BY YOUR DOCTOR FOR THIS PROCEDURE, then do NOT take this the morning of your colonoscopy.  Do NOT stop these medications on your own, they must be approved to be held by your doctor.  Your colonoscopy can NOT be done if you are on these medications.  Examples of blood thinners include: Coumadin, Aggrenox, Plavix, Pradaxa, Reapro, Pletal, Xarelto, Ticagrelor, Brilinta, Eliquis, and high dose aspirin (325 mg).  You do not have to stop baby aspirin 81 mg.    7.  IF YOU ARE DIABETIC:  NO INSULIN OR ORAL MEDICATIONS THE MORNING OF THE COLONOSCOPY.  TAKE ONLY HALF THE DOSE OF YOUR INSULIN THE DAY BEFORE THE COLONOSCOPY.  DO NOT TAKE ANY ORAL DIABETIC MEDICATIONS THE DAY BEFORE THE COLONOSCOPY.  IF YOU ARE AN INSULIN DEPENDENT DIABETIC WITH UNSTABLE BLOOD SUGARS, NOTIFY YOUR PRIMARY CARE PHYSICIAN FOR INSTRUCTIONS.    8. Please hold any GLP-1 medications prior to the procedure: Dulaglutide Trulicity(hold week prior), Exenatide Byetta (hold the morning of procedure), Semaglutide Ozempic (hold week prior), Liraglutide Victoza, Saxenda(hold week prior), Lixisenatide Adlyxin (hold the morning of procedure), Semaglutide Rybelsus (hold the morning of procedure), Tirzepatide Mounjaro (hold week  prior)        If you have any questions or concerns, please don't hesitate to call.    Sincerely,        Zeenat Elena MA

## 2023-12-01 NOTE — H&P
Short Stay Endoscopy History and Physical    PCP - Jose Alberto Hernández MD    Procedure - Colonoscopy  ASA - per anesthesia  Mallampati - per anesthesia  History of Anesthesia problems - no  Family history Anesthesia problems - no   Plan of anesthesia - General    HPI:  This is a 68 y.o. female here for evaluation of : personal history of colon polyps  Sister with crc around 60s      ROS:  Constitutional: No fevers, chills, No weight loss  CV: No chest pain  Pulm: No cough, No shortness of breath  GI: see HPI  Derm: No rash    Medical History:  has a past medical history of Cataract and Hypertension.    Surgical History:  has a past surgical history that includes Colonoscopy (N/A, 6/7/2018) and Colonoscopy (N/A, 6/14/2018).    Family History: family history is not on file.. Otherwise no colon cancer, inflammatory bowel disease, or GI malignancies.    Social History:  reports that she has never smoked. She has never used smokeless tobacco. She reports that she does not drink alcohol and does not use drugs.    Review of patient's allergies indicates:  No Known Allergies    Medications:   Medications Prior to Admission   Medication Sig Dispense Refill Last Dose    diclofenac sodium (VOLTAREN) 1 % Gel Apply 2 g topically 2 (two) times daily. 50 g 1     ibuprofen (ADVIL,MOTRIN) 600 MG tablet Take 1 tablet (600 mg total) by mouth every 8 (eight) hours as needed for Pain. 40 tablet 0 More than a month         Physical Exam:    Vital Signs:   Vitals:    12/01/23 0816   BP: (!) 145/71   Pulse: 71   Resp: 17   Temp: 98.1 °F (36.7 °C)       General Appearance: Well appearing in no acute distress  Eyes:    No scleral icterus  ENT: Neck supple, Lips, mucosa, and tongue normal; teeth and gums normal  Abdomen: Soft, non tender, non distended with positive bowel sounds. No hepatosplenomegaly, ascites, or mass.  Extremities: 2+ pulses, no clubbing, cyanosis or edema  Skin: No rash      Labs:  Lab Results   Component Value Date    WBC  8.09 08/15/2023    HGB 13.1 08/15/2023    HCT 40.6 08/15/2023     08/15/2023    CHOL 238 (H) 08/15/2023    TRIG 224 (H) 08/15/2023    HDL 64 08/15/2023    ALT 12 10/23/2023    AST 15 10/23/2023     10/23/2023    K 4.6 10/23/2023     10/23/2023    CREATININE 0.9 10/23/2023    BUN 20 10/23/2023    CO2 26 10/23/2023    TSH 0.960 08/15/2023    HGBA1C 5.4 08/15/2023       I have explained the risks and benefits of endoscopy procedures to the patient including but not limited to bleeding, perforation, infection, and death.  The patient was asked if they understand and allowed to ask any further questions to their satisfaction.    Napoleon Jackson MD

## 2023-12-01 NOTE — TELEPHONE ENCOUNTER
12/1/23  1501  Attempted to contact patient for scheduling. No answer. Novm available. Will try call again later.

## 2023-12-01 NOTE — LETTER
September 25, 2023    Adenike Wagner  [address] [place] [address]                [address] [facility] [place] [address]  Phone: [phone]  Fax: [phone] Dear Ms. Wagner:    We have attempted to contact you to schedule a colonoscopy that was ordered by your doctor. Please contact the office to schedule at [phone].       If you have any questions or concerns, please don't hesitate to call.    Sincerely,        Zeenat Elena MA

## 2023-12-01 NOTE — TRANSFER OF CARE
"Anesthesia Transfer of Care Note    Patient: Adenike Wagner    Procedure(s) Performed: Procedure(s) (LRB):  COLONOSCOPY (N/A)    Patient location: GI    Anesthesia Type: general    Transport from OR: Transported from OR on room air with adequate spontaneous ventilation    Post pain: adequate analgesia    Post assessment: no apparent anesthetic complications    Post vital signs: stable    Level of consciousness: responds to stimulation    Nausea/Vomiting: no nausea/vomiting    Complications: none    Transfer of care protocol was followed      Last vitals: Visit Vitals  BP (!) 145/71 (Patient Position: Lying)   Pulse 71   Temp 36.7 °C (98.1 °F) (Temporal)   Resp 17   Ht 5' 4" (1.626 m)   Wt 81.6 kg (180 lb)   LMP 01/01/2013   SpO2 98%   Breastfeeding No   BMI 30.90 kg/m²     "

## 2023-12-01 NOTE — PROVATION PATIENT INSTRUCTIONS
Discharge Summary/Instructions after an Endoscopic Procedure  Patient Name: Adenike Wagner  Patient MRN: [id number]  Patient YOB: 1955 Friday, December 1, 2023  Napoleon Jackson MD  Dear patient,  As a result of recent federal legislation (The Federal Cures Act), you may   receive lab or pathology results from your procedure in your [facility]   account before your physician is able to contact you. Your physician or   their representative will relay the results to you with their   recommendations at their soonest availability.  Thank you,  Your health is very important to us during the Covid Crisis. Following your   procedure today, you will receive a daily text for 2 weeks asking about   signs or symptoms of Covid 19.  Please respond to this text when you   receive it so we can follow up and keep you as safe as possible.   RESTRICTIONS:  During your procedure today, you received medications for sedation.  These   medications may affect your judgment, balance and coordination.  Therefore,   for 24 hours, you have the following restrictions:   - DO NOT drive a car, operate machinery, make legal/financial decisions,   sign important papers or drink alcohol.    ACTIVITY:  Today: no heavy lifting, straining or running due to procedural   sedation/anesthesia.  The following day: return to full activity including work.  DIET:  Eat and drink normally unless instructed otherwise.     TREATMENT FOR COMMON SIDE EFFECTS:  - Mild abdominal pain, nausea, belching, bloating or excessive gas:  rest,   eat lightly and use a heating pad.  - Sore Throat: treat with throat lozenges and/or gargle with warm salt   water.  - Because air was used during the procedure, expelling large amounts of air   from your rectum or belching is normal.  - If a bowel prep was taken, you may not have a bowel movement for 1-3 days.    This is normal.  SYMPTOMS TO WATCH FOR AND REPORT TO YOUR PHYSICIAN:  1. Abdominal pain or bloating, other than  gas cramps.  2. Chest pain.  3. Back pain.  4. Signs of infection such as: chills or fever occurring within 24 hours   after the procedure.  5. Rectal bleeding, which would show as bright red, maroon, or black stools.   (A tablespoon of blood from the rectum is not serious, especially if   hemorrhoids are present.)  6. Vomiting.  7. Weakness or dizziness.  GO DIRECTLY TO THE NEAREST EMERGENCY ROOM IF YOU HAVE ANY OF THE FOLLOWING:      Difficulty breathing              Chills and/or fever over 101 F   Persistent vomiting and/or vomiting blood   Severe abdominal pain   Severe chest pain   Black, tarry stools   Bleeding- more than one tablespoon   Any other symptom or condition that you feel may need urgent attention  Your doctor recommends these additional instructions:  If any biopsies were taken, your doctors clinic will contact you in 1 to 2   weeks with any results.  - Discharge patient to home.   - Patient has a contact number available for emergencies.  The signs and   symptoms of potential delayed complications were discussed with the   patient.  Return to normal activities tomorrow.  Written discharge   instructions were provided to the patient.   - Resume previous diet.   - Continue present medications.   - Await pathology results.   - Repeat colonoscopy in 5 years for surveillance.   - Avoid NSAIDs (ibuprofen, aleve, naproxen, BC / Goodys, aspirin etc) for 10   days; Aspirin is OK to continue if indicated for cardiovascular   protection.  For questions, problems or results please call your physician - Napoleon Jackson MD.  EMERGENCY PHONE NUMBER: [phone],  LAB RESULTS: [phone]  IF A COMPLICATION OR EMERGENCY SITUATION ARISES AND YOU ARE UNABLE TO REACH   YOUR PHYSICIAN - GO DIRECTLY TO THE EMERGENCY ROOM.  Napoleon Jackson MD  12/1/2023 9:21:05 AM  This report has been verified and signed electronically.  Dear patient,  As a result of recent federal legislation (The Federal Cures Act), you  may   receive lab or pathology results from your procedure in your [facility]   account before your physician is able to contact you. Your physician or   their representative will relay the results to you with their   recommendations at their soonest availability.  Thank you,  PROVATION

## 2023-12-01 NOTE — ANESTHESIA POSTPROCEDURE EVALUATION
Anesthesia Post Evaluation    Patient: Adenike Wagner    Procedure(s) Performed: Procedure(s) (LRB):  COLONOSCOPY (N/A)    Final Anesthesia Type: general      Patient location during evaluation: GI PACU  Patient participation: Yes- Able to Participate  Level of consciousness: awake and alert and oriented  Post-procedure vital signs: reviewed and stable  Pain management: adequate  Airway patency: patent    PONV status at discharge: No PONV  Anesthetic complications: no      Cardiovascular status: blood pressure returned to baseline  Respiratory status: unassisted and spontaneous ventilation  Hydration status: euvolemic  Follow-up not needed.              Vitals Value Taken Time   /69 12/01/23 0955   Temp  12/01/23 1022   Pulse 60 12/01/23 0955   Resp 16 12/01/23 0955   SpO2 97 % 12/01/23 0955         Event Time   Out of Recovery 10:11:06         Pain/Jean-Pierre Score: No data recorded

## 2023-12-01 NOTE — TELEPHONE ENCOUNTER
This patient came for colonoscopy but asked about her surgery referral  It was placed by another MD    Can we forward to someone who can assit with getting the surgery referral scheduled

## 2023-12-05 ENCOUNTER — TELEPHONE (OUTPATIENT)
Dept: GASTROENTEROLOGY | Facility: CLINIC | Age: 68
End: 2023-12-05
Payer: MEDICARE

## 2023-12-05 LAB
FINAL PATHOLOGIC DIAGNOSIS: NORMAL
GROSS: NORMAL
Lab: NORMAL

## 2023-12-12 ENCOUNTER — TELEPHONE (OUTPATIENT)
Dept: GASTROENTEROLOGY | Facility: CLINIC | Age: 68
End: 2023-12-12
Payer: MEDICARE

## 2023-12-12 ENCOUNTER — OFFICE VISIT (OUTPATIENT)
Dept: SURGERY | Facility: CLINIC | Age: 68
End: 2023-12-12
Payer: MEDICARE

## 2023-12-12 VITALS
BODY MASS INDEX: 31.81 KG/M2 | HEART RATE: 77 BPM | DIASTOLIC BLOOD PRESSURE: 71 MMHG | HEIGHT: 64 IN | SYSTOLIC BLOOD PRESSURE: 124 MMHG | WEIGHT: 186.31 LBS

## 2023-12-12 DIAGNOSIS — K80.20 CALCULUS OF GALLBLADDER WITHOUT CHOLECYSTITIS WITHOUT OBSTRUCTION: Primary | ICD-10-CM

## 2023-12-12 PROCEDURE — 1159F MED LIST DOCD IN RCRD: CPT | Mod: CPTII,S$GLB,, | Performed by: SURGERY

## 2023-12-12 PROCEDURE — 1126F PR PAIN SEVERITY QUANTIFIED, NO PAIN PRESENT: ICD-10-PCS | Mod: CPTII,S$GLB,, | Performed by: SURGERY

## 2023-12-12 PROCEDURE — 3008F PR BODY MASS INDEX (BMI) DOCUMENTED: ICD-10-PCS | Mod: CPTII,S$GLB,, | Performed by: SURGERY

## 2023-12-12 PROCEDURE — 3008F BODY MASS INDEX DOCD: CPT | Mod: CPTII,S$GLB,, | Performed by: SURGERY

## 2023-12-12 PROCEDURE — 1101F PR PT FALLS ASSESS DOC 0-1 FALLS W/OUT INJ PAST YR: ICD-10-PCS | Mod: CPTII,S$GLB,, | Performed by: SURGERY

## 2023-12-12 PROCEDURE — 3074F PR MOST RECENT SYSTOLIC BLOOD PRESSURE < 130 MM HG: ICD-10-PCS | Mod: CPTII,S$GLB,, | Performed by: SURGERY

## 2023-12-12 PROCEDURE — 3044F PR MOST RECENT HEMOGLOBIN A1C LEVEL <7.0%: ICD-10-PCS | Mod: CPTII,S$GLB,, | Performed by: SURGERY

## 2023-12-12 PROCEDURE — 3078F DIAST BP <80 MM HG: CPT | Mod: CPTII,S$GLB,, | Performed by: SURGERY

## 2023-12-12 PROCEDURE — 3288F FALL RISK ASSESSMENT DOCD: CPT | Mod: CPTII,S$GLB,, | Performed by: SURGERY

## 2023-12-12 PROCEDURE — 99999 PR PBB SHADOW E&M-EST. PATIENT-LVL II: ICD-10-PCS | Mod: PBBFAC,,, | Performed by: SURGERY

## 2023-12-12 PROCEDURE — 99204 OFFICE O/P NEW MOD 45 MIN: CPT | Mod: S$GLB,,, | Performed by: SURGERY

## 2023-12-12 PROCEDURE — 3044F HG A1C LEVEL LT 7.0%: CPT | Mod: CPTII,S$GLB,, | Performed by: SURGERY

## 2023-12-12 PROCEDURE — 3078F PR MOST RECENT DIASTOLIC BLOOD PRESSURE < 80 MM HG: ICD-10-PCS | Mod: CPTII,S$GLB,, | Performed by: SURGERY

## 2023-12-12 PROCEDURE — 1101F PT FALLS ASSESS-DOCD LE1/YR: CPT | Mod: CPTII,S$GLB,, | Performed by: SURGERY

## 2023-12-12 PROCEDURE — 3074F SYST BP LT 130 MM HG: CPT | Mod: CPTII,S$GLB,, | Performed by: SURGERY

## 2023-12-12 PROCEDURE — 3288F PR FALLS RISK ASSESSMENT DOCUMENTED: ICD-10-PCS | Mod: CPTII,S$GLB,, | Performed by: SURGERY

## 2023-12-12 PROCEDURE — 1126F AMNT PAIN NOTED NONE PRSNT: CPT | Mod: CPTII,S$GLB,, | Performed by: SURGERY

## 2023-12-12 PROCEDURE — 99204 PR OFFICE/OUTPT VISIT, NEW, LEVL IV, 45-59 MIN: ICD-10-PCS | Mod: S$GLB,,, | Performed by: SURGERY

## 2023-12-12 PROCEDURE — 1159F PR MEDICATION LIST DOCUMENTED IN MEDICAL RECORD: ICD-10-PCS | Mod: CPTII,S$GLB,, | Performed by: SURGERY

## 2023-12-12 PROCEDURE — 99999 PR PBB SHADOW E&M-EST. PATIENT-LVL II: CPT | Mod: PBBFAC,,, | Performed by: SURGERY

## 2023-12-12 NOTE — PROGRESS NOTES
[facility]  OUTPATIENT H&P    REASON FOR VISIT/CC: Gallstone    HPI: Adenike Wagner is a 68 y.o. female with significant abdominal pain 3 weeks ago.  Pt noted sharp pain at left mid abdomen with radiation to the right sided.  She has also had some bloating.  She denies postprandial pain.  U/S shows large gallstone ( 2.7 cm ).  She has had no pain since the episode.     I have reviewed the patient's chart including prior progress notes, procedures and testing.     ROS:   Review of Systems   All other systems reviewed and are negative.      PROBLEM LIST:  Patient Active Problem List   Diagnosis    Peripheral neuropathy    HTN (hypertension)    Postprandial abdominal bloating    Early satiety    Breast cancer screening    Screen for colon cancer         HISTORY  Past Medical History:   Diagnosis Date    Cataract     Hypertension        Past Surgical History:   Procedure Laterality Date    COLONOSCOPY N/A 6/7/2018    Procedure: COLONOSCOPY;  Surgeon: Mikayla Trivedi MD;  Location: [facility];  Service: Endoscopy;  Laterality: N/A;  aborted, inadequate prep    COLONOSCOPY N/A 6/14/2018    Procedure: COLONOSCOPY;  Surgeon: Mikayla Trivedi MD;  Location: [facility];  Service: Endoscopy;  Laterality: N/A;    COLONOSCOPY N/A 12/1/2023    Procedure: COLONOSCOPY;  Surgeon: Napoleon Jackson MD;  Location: [facility];  Service: Endoscopy;  Laterality: N/A;       Social History     Tobacco Use    Smoking status: Never    Smokeless tobacco: Never   Substance Use Topics    Alcohol use: No    Drug use: No       No family history on file.      MEDS:  Current Outpatient Medications on File Prior to Visit   Medication Sig Dispense Refill    [DISCONTINUED] diclofenac sodium (VOLTAREN) 1 % Gel Apply 2 g topically 2 (two) times daily. 50 g 1    [DISCONTINUED] ibuprofen (ADVIL,MOTRIN) 600 MG tablet Take 1 tablet (600 mg total) by mouth every 8 (eight) hours as needed for Pain. 40 tablet 0     No current  "facility-administered medications on file prior to visit.       ALLERGIES:  Review of patient's allergies indicates:  No Known Allergies      VITALS:  Vitals:    12/12/23 0930   BP: 124/71   Pulse: 77         PHYSICAL EXAM:  Physical Exam  Constitutional:       Appearance: Normal appearance.   HENT:      Head: Normocephalic and atraumatic.   Pulmonary:      Effort: Pulmonary effort is normal.   Abdominal:      Palpations: Abdomen is soft.   Skin:     General: Skin is warm and dry.   Neurological:      Mental Status: Mental status is at baseline.   Psychiatric:         Mood and Affect: Mood normal.         Behavior: Behavior normal.           LABS:  Lab Results   Component Value Date    WBC 8.09 08/15/2023    RBC 4.58 08/15/2023    HGB 13.1 08/15/2023    HCT 40.6 08/15/2023     08/15/2023     Lab Results   Component Value Date    GLU 95 10/23/2023     10/23/2023    K 4.6 10/23/2023     10/23/2023    CO2 26 10/23/2023    BUN 20 10/23/2023    CREATININE 0.9 10/23/2023    CALCIUM 10.3 10/23/2023     Lab Results   Component Value Date    ALT 12 10/23/2023    AST 15 10/23/2023    ALKPHOS 113 10/23/2023    BILITOT 0.4 10/23/2023     No results found for: "MG", "PHOS"    STUDIES:  U/S images and reports were personally reviewed.        ASSESSMENT & PLAN:  68 y.o. female with large gallstone.  Her symptoms are not exactly classic given the location but could certainly be from the stone.  We discussed cholecystectomy vs observation.  She would like to observe for now since she has had no issues aside from the one episode.      Durga Koehler M.D., F.A.C.S.  Abqrnt-Vafrjntxg-Htwlanq and General Surgery  [facility]        "

## 2024-03-11 ENCOUNTER — OFFICE VISIT (OUTPATIENT)
Dept: FAMILY MEDICINE | Facility: HOSPITAL | Age: 69
End: 2024-03-11
Payer: MEDICARE

## 2024-03-11 VITALS
DIASTOLIC BLOOD PRESSURE: 80 MMHG | OXYGEN SATURATION: 98 % | BODY MASS INDEX: 31.62 KG/M2 | HEART RATE: 69 BPM | HEIGHT: 64 IN | WEIGHT: 185.19 LBS | SYSTOLIC BLOOD PRESSURE: 148 MMHG

## 2024-03-11 DIAGNOSIS — K80.20 CALCULUS OF GALLBLADDER WITHOUT CHOLECYSTITIS WITHOUT OBSTRUCTION: Primary | ICD-10-CM

## 2024-03-11 DIAGNOSIS — Z12.31 ENCOUNTER FOR SCREENING MAMMOGRAM FOR BREAST CANCER: ICD-10-CM

## 2024-03-11 PROCEDURE — 99214 OFFICE O/P EST MOD 30 MIN: CPT

## 2024-03-11 NOTE — PROGRESS NOTES
History & Physical  [facility]      SUBJECTIVE:     History of Present Illness:  Patient is a 69 y.o. female. Presents to clinic for mammogram and surgery referral.      Gallstones  Patient initially complained of sharp L sided abdominal pain with radiation to R flank and bloating. U/S shows large gallstone ( 2.7 cm ).  She was seen by Dr. Koehler and patient decided on observation. She denies pain currently. However she reports postprandial pain and would like to be seen by gen surgery again as she would like to go through with the procedure.    Preventative:  Mammogram, agreeable to have completed.    ROS  A 10+ review of systems was performed with pertinent positives and negatives noted above in the history of present illness.  Other systems were negative unless otherwise specified.    Review of patient's allergies indicates:  No Known Allergies    Past Medical History:   Diagnosis Date    Cataract     Hypertension        Current Outpatient Medications   Medication Instructions    COVID [id number],12up,,andu,,PF, (SPIKEVAX [id number],12Y UP,,PF,) 50 mcg/0.5 mL injection Intramuscular       Past Surgical History:   Procedure Laterality Date    COLONOSCOPY N/A 6/7/2018    Procedure: COLONOSCOPY;  Surgeon: Mikayla Trivedi MD;  Location: [facility];  Service: Endoscopy;  Laterality: N/A;  aborted, inadequate prep    COLONOSCOPY N/A 6/14/2018    Procedure: COLONOSCOPY;  Surgeon: Mikayla Trivedi MD;  Location: [facility];  Service: Endoscopy;  Laterality: N/A;    COLONOSCOPY N/A 12/1/2023    Procedure: COLONOSCOPY;  Surgeon: Napoleon Jackson MD;  Location: [facility];  Service: Endoscopy;  Laterality: N/A;       No family history on file.    Social History     Tobacco Use    Smoking status: Never    Smokeless tobacco: Never   Substance Use Topics    Alcohol use: No    Drug use: No        OBJECTIVE:     Vital Signs (Most Recent)  Vitals:    03/11/24 1008   BP: (!) 148/80   Pulse: 69   SpO2: 98%   Weight: 84  "kg (185 lb 3 oz)   Height: 5' 4" (1.626 m)     BMI: Body mass index is 31.79 kg/m².    Physical Exam:  Physical Exam  Constitutional:       General: She is not in acute distress.     Appearance: Normal appearance. She is not ill-appearing, toxic-appearing or diaphoretic.   HENT:      Head: Normocephalic and atraumatic.      Right Ear: Tympanic membrane normal.      Left Ear: Tympanic membrane normal.      Nose: Nose normal.      Mouth/Throat:      Mouth: Mucous membranes are moist.   Eyes:      Extraocular Movements: Extraocular movements intact.      Pupils: Pupils are equal, round, and reactive to light.   Cardiovascular:      Rate and Rhythm: Normal rate and regular rhythm.      Pulses: Normal pulses.      Heart sounds: Normal heart sounds.   Pulmonary:      Effort: Pulmonary effort is normal. No respiratory distress.      Breath sounds: Normal breath sounds. No wheezing or rales.   Abdominal:      General: Abdomen is flat. Bowel sounds are normal. There is no distension.      Palpations: Abdomen is soft. There is no mass.      Tenderness: There is no abdominal tenderness. There is no guarding.   Musculoskeletal:         General: Normal range of motion.   Skin:     General: Skin is warm and dry.   Neurological:      General: No focal deficit present.      Mental Status: She is alert.   Psychiatric:         Mood and Affect: Mood normal.         Behavior: Behavior normal.        Labs  Hemoglobin A1C   Date Value Ref Range Status   08/15/2023 5.4 4.0 - 5.6 % Final     Comment:     ADA Screening Guidelines:  5.7-6.4%  Consistent with prediabetes  >or=6.5%  Consistent with diabetes    High levels of fetal hemoglobin interfere with the HbA1C  assay. Heterozygous hemoglobin variants (HbS, HgC, etc)do  not significantly interfere with this assay.   However, presence of multiple variants may affect accuracy.     09/03/2020 5.2 4.0 - 5.6 % Final     Comment:     ADA Screening Guidelines:  5.7-6.4%  Consistent with " prediabetes  >or=6.5%  Consistent with diabetes  High levels of fetal hemoglobin interfere with the HbA1C  assay. Heterozygous hemoglobin variants (HbS, HgC, etc)do  not significantly interfere with this assay.   However, presence of multiple variants may affect accuracy.     02/21/2014 5.4 4.5 - 6.2 % Final     TSH   Date Value Ref Range Status   08/15/2023 0.960 0.400 - 4.000 uIU/mL Final        ASSESSMENT/PLAN:   69 y.o.female presents to clinic for    Calculus of gallbladder without cholecystitis without obstruction  -     Ambulatory referral/consult to General Surgery; Future; Expected date: 03/18/2024    Encounter for screening mammogram for breast cancer  -     Mammo Digital Screening Bilat w/ Gino; Future; Expected date: 03/11/2024        Provided patient with anticipatory guidance and return precautions. Treatment plan discussed with patient, all questions answered, and patient acknowledged understanding and verbal agreement.      Follow-up in:  3 months. Or sooner PRN as acute concerns arise.         Medical decision making straight forward and not complex during this visit.       Case discussed with Dr. SHANE Kuhn.    ________________________  Lakisha Garza M.D.  [facility] PGY-1  3/11/2024 12:04 PM      *This note was partially created using [employer] Voice Recognition software. Typographical and content errors may occur with this process. While efforts are made to detect and correct such errors, in some cases errors will persist. For this reason, wording in this document should be considered in the proper context and not strictly verbatim.

## 2024-03-15 NOTE — PROGRESS NOTES
I assume primary medical responsibility for this patient. I have reviewed the history, physical, and assessement & treatment plan with the resident and agree that the care is reasonable and necessary. This service has been performed by a resident without the presence of a teaching physician under the primary care exception. If necessary, an addendum of additional findings or evaluation beyond the resident documentation will be noted below.      Vanna Kuhn MD    [facility]

## 2024-04-16 ENCOUNTER — HOSPITAL ENCOUNTER (OUTPATIENT)
Dept: RADIOLOGY | Facility: HOSPITAL | Age: 69
Discharge: HOME | End: 2024-04-16
Payer: MEDICARE

## 2024-04-16 DIAGNOSIS — Z12.31 ENCOUNTER FOR SCREENING MAMMOGRAM FOR BREAST CANCER: ICD-10-CM

## 2024-04-16 PROCEDURE — 77067 SCR MAMMO BI INCL CAD: CPT | Mod: 26,,, | Performed by: RADIOLOGY

## 2024-04-16 PROCEDURE — 77063 BREAST TOMOSYNTHESIS BI: CPT | Mod: 26,,, | Performed by: RADIOLOGY

## 2024-04-16 PROCEDURE — 77063 BREAST TOMOSYNTHESIS BI: CPT | Mod: TC

## 2024-11-13 ENCOUNTER — PATIENT MESSAGE (OUTPATIENT)
Dept: ADMINISTRATIVE | Facility: HOSPITAL | Age: 69
End: 2024-11-13
Payer: MEDICARE

## 2024-11-21 ENCOUNTER — PATIENT MESSAGE (OUTPATIENT)
Dept: ADMINISTRATIVE | Facility: HOSPITAL | Age: 69
End: 2024-11-21
Payer: MEDICARE

## 2024-12-11 ENCOUNTER — PATIENT MESSAGE (OUTPATIENT)
Dept: ADMINISTRATIVE | Facility: HOSPITAL | Age: 69
End: 2024-12-11
Payer: MEDICARE

## 2025-04-30 ENCOUNTER — LAB VISIT (OUTPATIENT)
Dept: PRIMARY CARE CLINIC | Facility: CLINIC | Age: 70
End: 2025-04-30
Payer: MEDICARE

## 2025-04-30 ENCOUNTER — OFFICE VISIT (OUTPATIENT)
Dept: PRIMARY CARE CLINIC | Facility: CLINIC | Age: 70
End: 2025-04-30
Payer: MEDICARE

## 2025-04-30 VITALS
RESPIRATION RATE: 16 BRPM | OXYGEN SATURATION: 96 % | HEART RATE: 66 BPM | WEIGHT: 176.06 LBS | SYSTOLIC BLOOD PRESSURE: 129 MMHG | BODY MASS INDEX: 30.06 KG/M2 | HEIGHT: 64 IN | DIASTOLIC BLOOD PRESSURE: 72 MMHG

## 2025-04-30 DIAGNOSIS — Z13.220 ENCOUNTER FOR LIPID SCREENING FOR CARDIOVASCULAR DISEASE: ICD-10-CM

## 2025-04-30 DIAGNOSIS — R53.82 CHRONIC FATIGUE: ICD-10-CM

## 2025-04-30 DIAGNOSIS — Z00.00 WELL ADULT EXAM: ICD-10-CM

## 2025-04-30 DIAGNOSIS — Z13.6 ENCOUNTER FOR LIPID SCREENING FOR CARDIOVASCULAR DISEASE: ICD-10-CM

## 2025-04-30 DIAGNOSIS — Z00.00 WELL ADULT EXAM: Primary | ICD-10-CM

## 2025-04-30 DIAGNOSIS — K80.20 GALLSTONES: ICD-10-CM

## 2025-04-30 DIAGNOSIS — Z12.31 ENCOUNTER FOR SCREENING MAMMOGRAM FOR BREAST CANCER: ICD-10-CM

## 2025-04-30 DIAGNOSIS — D17.23 LIPOMA OF RIGHT LOWER EXTREMITY: ICD-10-CM

## 2025-04-30 LAB
ABSOLUTE EOSINOPHIL (OHS): 0.27 K/UL
ABSOLUTE MONOCYTE (OHS): 0.35 K/UL (ref 0.3–1)
ABSOLUTE NEUTROPHIL COUNT (OHS): 4.09 K/UL (ref 1.8–7.7)
ALBUMIN SERPL BCP-MCNC: 4 G/DL (ref 3.5–5.2)
ALP SERPL-CCNC: 120 UNIT/L (ref 40–150)
ALT SERPL W/O P-5'-P-CCNC: 10 UNIT/L (ref 10–44)
ANION GAP (OHS): 8 MMOL/L (ref 8–16)
AST SERPL-CCNC: 16 UNIT/L (ref 11–45)
BASOPHILS # BLD AUTO: 0.05 K/UL
BASOPHILS NFR BLD AUTO: 0.8 %
BILIRUB SERPL-MCNC: 0.5 MG/DL (ref 0.1–1)
BUN SERPL-MCNC: 18 MG/DL (ref 8–23)
CALCIUM SERPL-MCNC: 9.7 MG/DL (ref 8.7–10.5)
CHLORIDE SERPL-SCNC: 105 MMOL/L (ref 95–110)
CHOLEST SERPL-MCNC: 236 MG/DL (ref 120–199)
CHOLEST/HDLC SERPL: 3.9 {RATIO} (ref 2–5)
CO2 SERPL-SCNC: 26 MMOL/L (ref 23–29)
CREAT SERPL-MCNC: 0.7 MG/DL (ref 0.5–1.4)
ERYTHROCYTE [DISTWIDTH] IN BLOOD BY AUTOMATED COUNT: 12.2 % (ref 11.5–14.5)
GFR SERPLBLD CREATININE-BSD FMLA CKD-EPI: >60 ML/MIN/1.73/M2
GLUCOSE SERPL-MCNC: 89 MG/DL (ref 70–110)
HCT VFR BLD AUTO: 42.1 % (ref 37–48.5)
HDLC SERPL-MCNC: 61 MG/DL (ref 40–75)
HDLC SERPL: 25.8 % (ref 20–50)
HGB BLD-MCNC: 13.6 GM/DL (ref 12–16)
IMM GRANULOCYTES # BLD AUTO: 0.02 K/UL (ref 0–0.04)
IMM GRANULOCYTES NFR BLD AUTO: 0.3 % (ref 0–0.5)
LDLC SERPL CALC-MCNC: 134.4 MG/DL (ref 63–159)
LYMPHOCYTES # BLD AUTO: 1.36 K/UL (ref 1–4.8)
MCH RBC QN AUTO: 29.1 PG (ref 27–31)
MCHC RBC AUTO-ENTMCNC: 32.3 G/DL (ref 32–36)
MCV RBC AUTO: 90 FL (ref 82–98)
NONHDLC SERPL-MCNC: 175 MG/DL
NUCLEATED RBC (/100WBC) (OHS): 0 /100 WBC
PLATELET # BLD AUTO: 321 K/UL (ref 150–450)
PMV BLD AUTO: 9.5 FL (ref 9.2–12.9)
POTASSIUM SERPL-SCNC: 4.6 MMOL/L (ref 3.5–5.1)
PROT SERPL-MCNC: 8.2 GM/DL (ref 6–8.4)
RBC # BLD AUTO: 4.67 M/UL (ref 4–5.4)
RELATIVE EOSINOPHIL (OHS): 4.4 %
RELATIVE LYMPHOCYTE (OHS): 22.1 % (ref 18–48)
RELATIVE MONOCYTE (OHS): 5.7 % (ref 4–15)
RELATIVE NEUTROPHIL (OHS): 66.7 % (ref 38–73)
SODIUM SERPL-SCNC: 139 MMOL/L (ref 136–145)
TRIGL SERPL-MCNC: 203 MG/DL (ref 30–150)
WBC # BLD AUTO: 6.14 K/UL (ref 3.9–12.7)

## 2025-04-30 PROCEDURE — 3008F BODY MASS INDEX DOCD: CPT | Mod: CPTII,S$GLB,, | Performed by: FAMILY MEDICINE

## 2025-04-30 PROCEDURE — 3078F DIAST BP <80 MM HG: CPT | Mod: CPTII,S$GLB,, | Performed by: FAMILY MEDICINE

## 2025-04-30 PROCEDURE — 1159F MED LIST DOCD IN RCRD: CPT | Mod: CPTII,S$GLB,, | Performed by: FAMILY MEDICINE

## 2025-04-30 PROCEDURE — 1160F RVW MEDS BY RX/DR IN RCRD: CPT | Mod: CPTII,S$GLB,, | Performed by: FAMILY MEDICINE

## 2025-04-30 PROCEDURE — 85025 COMPLETE CBC W/AUTO DIFF WBC: CPT

## 2025-04-30 PROCEDURE — 82040 ASSAY OF SERUM ALBUMIN: CPT

## 2025-04-30 PROCEDURE — 80061 LIPID PANEL: CPT

## 2025-04-30 PROCEDURE — 99999 PR PBB SHADOW E&M-EST. PATIENT-LVL III: CPT | Mod: PBBFAC,,, | Performed by: FAMILY MEDICINE

## 2025-04-30 PROCEDURE — 3288F FALL RISK ASSESSMENT DOCD: CPT | Mod: CPTII,S$GLB,, | Performed by: FAMILY MEDICINE

## 2025-04-30 PROCEDURE — 3074F SYST BP LT 130 MM HG: CPT | Mod: CPTII,S$GLB,, | Performed by: FAMILY MEDICINE

## 2025-04-30 PROCEDURE — 99397 PER PM REEVAL EST PAT 65+ YR: CPT | Mod: S$GLB,,, | Performed by: FAMILY MEDICINE

## 2025-04-30 PROCEDURE — 1101F PT FALLS ASSESS-DOCD LE1/YR: CPT | Mod: CPTII,S$GLB,, | Performed by: FAMILY MEDICINE

## 2025-04-30 PROCEDURE — 1126F AMNT PAIN NOTED NONE PRSNT: CPT | Mod: CPTII,S$GLB,, | Performed by: FAMILY MEDICINE

## 2025-04-30 NOTE — PROGRESS NOTES
Subjective     Patient ID: Adenike Wagner is a 70 y.o. female.    Chief Complaint: Annual Exam, Hypertension (Follow up. ), and Mass (Located to the right inner thigh. )    HPI:  Patient here for annual exam.  Reports chronic bloating due to gallstones.  No pain.  Also with a history of tubular adenoma 2023.  Sister with colon cancer.  Review of Systems   Constitutional:  Positive for fatigue.   Eyes:  Positive for visual disturbance.   Respiratory: Negative.     Gastrointestinal:  Positive for abdominal distention and abdominal pain. Negative for blood in stool, change in bowel habit, constipation, diarrhea, nausea and vomiting.   Integumentary:  Negative.   Psychiatric/Behavioral:  Positive for dysphoric mood.           Objective     Physical Exam  Constitutional:       Appearance: Normal appearance. She is well-developed.   HENT:      Head: Normocephalic and atraumatic.      Right Ear: External ear normal.      Left Ear: External ear normal.      Nose: Nose normal.   Eyes:      General: No scleral icterus.     Conjunctiva/sclera: Conjunctivae normal.      Pupils: Pupils are equal, round, and reactive to light.   Neck:      Thyroid: No thyromegaly.   Cardiovascular:      Rate and Rhythm: Normal rate and regular rhythm.      Heart sounds: No murmur heard.     No friction rub. No gallop.   Pulmonary:      Effort: Pulmonary effort is normal.      Breath sounds: Normal breath sounds. No wheezing or rales.   Abdominal:      General: Bowel sounds are normal. There is no distension.      Palpations: Abdomen is soft. There is no mass.      Tenderness: There is abdominal tenderness (RUQ).   Musculoskeletal:         General: No deformity.      Cervical back: Normal range of motion and neck supple.   Lymphadenopathy:      Cervical: No cervical adenopathy.      Upper Body:      Right upper body: No supraclavicular adenopathy.      Left upper body: No supraclavicular adenopathy.   Skin:     General: Skin is warm and dry.       Findings: Lesion (soft tissue mass right inner thigh) present. No rash.   Neurological:      Mental Status: She is alert and oriented to person, place, and time.   Psychiatric:         Mood and Affect: Mood normal.         Behavior: Behavior normal.            Assessment and Plan     1. Well adult exam  Recommend annual eye exam.  Recommend healthy diet and exercise  -     Comprehensive Metabolic Panel; Future; Expected date: 04/30/2025    2. Encounter for screening mammogram for breast cancer  -     Mammo Digital Screening Bilat w/ Gino (XPD); Future; Expected date: 04/30/2025    3. Gallstones  -     Ambulatory referral/consult to General Surgery; Future; Expected date: 05/07/2025    4. Encounter for lipid screening for cardiovascular disease  -     Lipid Panel; Future; Expected date: 04/30/2025    5. Lipoma of right lower extremity  Benign    6. Chronic fatigue  -     CBC Auto Differential; Future; Expected date: 04/30/2025                 No follow-ups on file.

## 2025-05-02 ENCOUNTER — RESULTS FOLLOW-UP (OUTPATIENT)
Dept: PRIMARY CARE CLINIC | Facility: CLINIC | Age: 70
End: 2025-05-02

## 2025-05-02 ENCOUNTER — HOSPITAL ENCOUNTER (OUTPATIENT)
Dept: RADIOLOGY | Facility: HOSPITAL | Age: 70
Discharge: HOME | End: 2025-05-02
Attending: FAMILY MEDICINE
Payer: MEDICARE

## 2025-05-02 VITALS — WEIGHT: 176 LBS | BODY MASS INDEX: 30.05 KG/M2 | HEIGHT: 64 IN

## 2025-05-02 DIAGNOSIS — Z12.31 ENCOUNTER FOR SCREENING MAMMOGRAM FOR BREAST CANCER: ICD-10-CM

## 2025-05-02 PROCEDURE — 77063 BREAST TOMOSYNTHESIS BI: CPT | Mod: 26,,, | Performed by: RADIOLOGY

## 2025-05-02 PROCEDURE — 77067 SCR MAMMO BI INCL CAD: CPT | Mod: 26,,, | Performed by: RADIOLOGY

## 2025-05-02 PROCEDURE — 77067 SCR MAMMO BI INCL CAD: CPT | Mod: TC
